# Patient Record
Sex: MALE | Race: WHITE | Employment: FULL TIME | URBAN - METROPOLITAN AREA
[De-identification: names, ages, dates, MRNs, and addresses within clinical notes are randomized per-mention and may not be internally consistent; named-entity substitution may affect disease eponyms.]

---

## 2017-06-16 ENCOUNTER — TELEPHONE (OUTPATIENT)
Dept: CARDIOLOGY CLINIC | Age: 62
End: 2017-06-16

## 2017-06-16 RX ORDER — CLOPIDOGREL BISULFATE 75 MG/1
75 TABLET ORAL DAILY
Qty: 90 TABLET | Refills: 1 | Status: SHIPPED | OUTPATIENT
Start: 2017-06-16 | End: 2017-12-11 | Stop reason: SDUPTHER

## 2017-06-16 RX ORDER — METOPROLOL SUCCINATE 25 MG/1
12.5 TABLET, EXTENDED RELEASE ORAL DAILY
Qty: 90 TABLET | Refills: 1 | Status: SHIPPED | OUTPATIENT
Start: 2017-06-16 | End: 2018-06-25 | Stop reason: SDUPTHER

## 2017-06-26 ENCOUNTER — OFFICE VISIT (OUTPATIENT)
Dept: CARDIOLOGY CLINIC | Age: 62
End: 2017-06-26

## 2017-06-26 VITALS
HEART RATE: 86 BPM | SYSTOLIC BLOOD PRESSURE: 120 MMHG | WEIGHT: 249.3 LBS | DIASTOLIC BLOOD PRESSURE: 84 MMHG | HEIGHT: 74 IN | BODY MASS INDEX: 31.99 KG/M2 | OXYGEN SATURATION: 97 %

## 2017-06-26 DIAGNOSIS — R07.9 CHEST PAIN, UNSPECIFIED TYPE: ICD-10-CM

## 2017-06-26 DIAGNOSIS — R06.02 SOB (SHORTNESS OF BREATH): ICD-10-CM

## 2017-06-26 DIAGNOSIS — E78.49 OTHER HYPERLIPIDEMIA: ICD-10-CM

## 2017-06-26 DIAGNOSIS — Z71.6 TOBACCO ABUSE COUNSELING: ICD-10-CM

## 2017-06-26 DIAGNOSIS — I25.10 CAD IN NATIVE ARTERY: Primary | ICD-10-CM

## 2017-06-26 PROCEDURE — 99214 OFFICE O/P EST MOD 30 MIN: CPT | Performed by: INTERNAL MEDICINE

## 2017-11-01 ENCOUNTER — HOSPITAL ENCOUNTER (OUTPATIENT)
Dept: OTHER | Age: 62
Discharge: HOME | End: 2017-11-30
Attending: INTERNAL MEDICINE | Admitting: INTERNAL MEDICINE

## 2017-12-01 ENCOUNTER — HOSPITAL ENCOUNTER (OUTPATIENT)
Dept: OTHER | Age: 62
Discharge: HOME | End: 2017-12-31
Attending: INTERNAL MEDICINE | Admitting: INTERNAL MEDICINE

## 2017-12-11 RX ORDER — CLOPIDOGREL BISULFATE 75 MG/1
TABLET ORAL
Qty: 30 TABLET | Refills: 6 | Status: SHIPPED | OUTPATIENT
Start: 2017-12-11 | End: 2018-09-04 | Stop reason: SDUPTHER

## 2018-01-01 ENCOUNTER — HOSPITAL ENCOUNTER (OUTPATIENT)
Dept: OTHER | Age: 63
Discharge: HOME | End: 2018-01-31
Attending: INTERNAL MEDICINE | Admitting: INTERNAL MEDICINE

## 2018-02-01 ENCOUNTER — HOSPITAL ENCOUNTER (OUTPATIENT)
Dept: OTHER | Age: 63
Discharge: HOME | End: 2018-02-28
Attending: INTERNAL MEDICINE | Admitting: INTERNAL MEDICINE

## 2018-03-01 ENCOUNTER — HOSPITAL ENCOUNTER (OUTPATIENT)
Dept: OTHER | Age: 63
Discharge: HOME | End: 2018-03-31
Attending: INTERNAL MEDICINE | Admitting: INTERNAL MEDICINE

## 2018-04-01 ENCOUNTER — HOSPITAL ENCOUNTER (OUTPATIENT)
Dept: OTHER | Age: 63
Discharge: HOME | End: 2018-04-30
Attending: INTERNAL MEDICINE | Admitting: INTERNAL MEDICINE

## 2018-05-01 ENCOUNTER — HOSPITAL ENCOUNTER (OUTPATIENT)
Dept: OTHER | Age: 63
Discharge: HOME | End: 2018-05-31
Attending: INTERNAL MEDICINE | Admitting: INTERNAL MEDICINE

## 2018-06-01 ENCOUNTER — HOSPITAL ENCOUNTER (OUTPATIENT)
Dept: OTHER | Age: 63
Discharge: HOME | End: 2018-06-30
Attending: INTERNAL MEDICINE | Admitting: INTERNAL MEDICINE

## 2018-06-25 RX ORDER — METOPROLOL SUCCINATE 25 MG/1
TABLET, EXTENDED RELEASE ORAL
Qty: 30 TABLET | Refills: 0 | Status: SHIPPED | OUTPATIENT
Start: 2018-06-25 | End: 2018-09-04 | Stop reason: SDUPTHER

## 2018-07-01 ENCOUNTER — HOSPITAL ENCOUNTER (OUTPATIENT)
Dept: OTHER | Age: 63
Discharge: HOME | End: 2018-07-01
Attending: INTERNAL MEDICINE | Admitting: INTERNAL MEDICINE

## 2018-07-27 RX ORDER — CLOPIDOGREL BISULFATE 75 MG/1
TABLET ORAL
Qty: 30 TABLET | Refills: 5 | OUTPATIENT
Start: 2018-07-27

## 2018-09-05 RX ORDER — METOPROLOL SUCCINATE 25 MG/1
TABLET, EXTENDED RELEASE ORAL
Qty: 45 TABLET | Refills: 0 | Status: SHIPPED | OUTPATIENT
Start: 2018-09-05 | End: 2018-12-06 | Stop reason: SDUPTHER

## 2018-09-05 RX ORDER — CLOPIDOGREL BISULFATE 75 MG/1
TABLET ORAL
Qty: 90 TABLET | Refills: 0 | Status: SHIPPED | OUTPATIENT
Start: 2018-09-05 | End: 2018-12-06 | Stop reason: SDUPTHER

## 2018-10-19 ENCOUNTER — HOSPITAL ENCOUNTER (OUTPATIENT)
Age: 63
Discharge: HOME | End: 2018-10-19

## 2018-10-19 PROCEDURE — 9900000038 HC CARDIAC REHAB PHASE 3 - MONTHLY

## 2018-11-20 ENCOUNTER — HOSPITAL ENCOUNTER (OUTPATIENT)
Age: 63
Discharge: HOME | End: 2018-11-20
Payer: COMMERCIAL

## 2018-11-20 PROCEDURE — 93798 PHYS/QHP OP CAR RHAB W/ECG: CPT

## 2018-11-20 PROCEDURE — 9900000038 HC CARDIAC REHAB PHASE 3 - MONTHLY

## 2018-12-06 ENCOUNTER — OFFICE VISIT (OUTPATIENT)
Dept: CARDIOLOGY CLINIC | Age: 63
End: 2018-12-06
Payer: COMMERCIAL

## 2018-12-06 VITALS
DIASTOLIC BLOOD PRESSURE: 80 MMHG | HEART RATE: 72 BPM | SYSTOLIC BLOOD PRESSURE: 138 MMHG | HEIGHT: 74 IN | BODY MASS INDEX: 31.95 KG/M2 | WEIGHT: 249 LBS

## 2018-12-06 DIAGNOSIS — I25.10 CAD IN NATIVE ARTERY: Primary | ICD-10-CM

## 2018-12-06 DIAGNOSIS — E78.49 OTHER HYPERLIPIDEMIA: ICD-10-CM

## 2018-12-06 DIAGNOSIS — Z71.6 TOBACCO ABUSE COUNSELING: ICD-10-CM

## 2018-12-06 DIAGNOSIS — I49.01 VENTRICULAR FIBRILLATION (HCC): ICD-10-CM

## 2018-12-06 PROCEDURE — 99214 OFFICE O/P EST MOD 30 MIN: CPT | Performed by: INTERNAL MEDICINE

## 2018-12-06 RX ORDER — METOPROLOL SUCCINATE 25 MG/1
12.5 TABLET, EXTENDED RELEASE ORAL DAILY
Qty: 45 TABLET | Refills: 3 | Status: SHIPPED | OUTPATIENT
Start: 2018-12-06 | End: 2018-12-13 | Stop reason: SDUPTHER

## 2018-12-06 RX ORDER — CLOPIDOGREL BISULFATE 75 MG/1
75 TABLET ORAL DAILY
Qty: 90 TABLET | Refills: 3 | Status: SHIPPED | OUTPATIENT
Start: 2018-12-06 | End: 2018-12-13 | Stop reason: SDUPTHER

## 2018-12-12 ENCOUNTER — HOSPITAL ENCOUNTER (OUTPATIENT)
Age: 63
Discharge: HOME | End: 2018-12-12

## 2018-12-12 PROCEDURE — 9900000038 HC CARDIAC REHAB PHASE 3 - MONTHLY

## 2018-12-13 RX ORDER — CLOPIDOGREL BISULFATE 75 MG/1
75 TABLET ORAL DAILY
Qty: 90 TABLET | Refills: 3 | Status: SHIPPED | OUTPATIENT
Start: 2018-12-13 | End: 2020-01-16 | Stop reason: SDUPTHER

## 2018-12-13 RX ORDER — METOPROLOL SUCCINATE 25 MG/1
12.5 TABLET, EXTENDED RELEASE ORAL DAILY
Qty: 45 TABLET | Refills: 3 | Status: SHIPPED | OUTPATIENT
Start: 2018-12-13 | End: 2019-08-23 | Stop reason: ALTCHOICE

## 2019-01-11 ENCOUNTER — HOSPITAL ENCOUNTER (OUTPATIENT)
Age: 64
Discharge: HOME | End: 2019-01-11

## 2019-01-11 PROCEDURE — 9900000038 HC CARDIAC REHAB PHASE 3 - MONTHLY

## 2019-02-20 ENCOUNTER — HOSPITAL ENCOUNTER (OUTPATIENT)
Age: 64
Discharge: HOME | End: 2019-02-20

## 2019-02-20 PROCEDURE — 9900000038 HC CARDIAC REHAB PHASE 3 - MONTHLY

## 2019-03-20 ENCOUNTER — HOSPITAL ENCOUNTER (OUTPATIENT)
Age: 64
Discharge: HOME | End: 2019-03-20

## 2019-03-20 PROCEDURE — 9900000038 HC CARDIAC REHAB PHASE 3 - MONTHLY

## 2019-04-17 ENCOUNTER — HOSPITAL ENCOUNTER (OUTPATIENT)
Age: 64
Discharge: HOME | End: 2019-04-17

## 2019-04-17 PROCEDURE — 9900000038 HC CARDIAC REHAB PHASE 3 - MONTHLY

## 2019-05-15 ENCOUNTER — HOSPITAL ENCOUNTER (OUTPATIENT)
Age: 64
Discharge: HOME | End: 2019-05-15

## 2019-05-15 PROCEDURE — 9900000038 HC CARDIAC REHAB PHASE 3 - MONTHLY

## 2019-06-19 ENCOUNTER — HOSPITAL ENCOUNTER (OUTPATIENT)
Age: 64
Discharge: HOME | End: 2019-06-19

## 2019-06-19 PROCEDURE — 9900000038 HC CARDIAC REHAB PHASE 3 - MONTHLY

## 2019-07-17 PROCEDURE — 9900000038 HC CARDIAC REHAB PHASE 3 - MONTHLY

## 2019-07-29 ENCOUNTER — HOSPITAL ENCOUNTER (OUTPATIENT)
Dept: CARDIAC REHAB | Age: 64
Discharge: HOME | End: 2019-07-29

## 2019-08-08 ENCOUNTER — HOSPITAL ENCOUNTER (OUTPATIENT)
Dept: NON INVASIVE DIAGNOSTICS | Age: 64
Discharge: HOME | End: 2019-08-08
Payer: MEDICARE

## 2019-08-08 DIAGNOSIS — I49.01 VENTRICULAR FIBRILLATION (HCC): ICD-10-CM

## 2019-08-08 DIAGNOSIS — I25.10 CAD IN NATIVE ARTERY: ICD-10-CM

## 2019-08-08 LAB
LV EF: 50 %
LVEF MODALITY: NORMAL

## 2019-08-08 PROCEDURE — 93351 STRESS TTE COMPLETE: CPT

## 2019-08-08 PROCEDURE — 93306 TTE W/DOPPLER COMPLETE: CPT

## 2019-08-08 PROCEDURE — 93320 DOPPLER ECHO COMPLETE: CPT

## 2019-08-15 NOTE — PROGRESS NOTES
[address]   Cardiac Follow Up    Chief Complaint   Patient presents with    Coronary Artery Disease    Hyperlipidemia     History of Present Illness:  Mr. Melanie Small is a 71 y.o. male. His history includes an admission to [facility] in February 2015 with complaints of chest pain and shortness of  breath for approximately 2 weeks. His EKG showed inferior ischemia, and patient subsequently had Vfib arrest. He was shocked twice. CPR was administered with restoration of normal sinus rhythm. He was transferred to [facility] and taken emergently to the cardiac catheterization lab which showed the left main to be normal, LAD 50%, proximal D1 30%, proximal circumflex normal, OM2 90% sequential lesions and less than 2 mm vessels, RCA 80% mid. LV gram initially showed an EF of 45% with anterior apical hypokinesis. After intervention, EF improved to 55% with resolution of anterior apical hypokinesis. He had a PCI of the RCA using a 3.5 x 23mm drug-eluting stent postdilated to 3.75, and IVUS to the RCA 80% mid without clot. Due to ventricular fibrillation in the presence of his MI,  LifeVest versus medical treatment with Holter monitoring and a plain stress later were discussed. In the week after discharge ACE inhibitor was held. LV function was normal. He decided not to pursue a LifeVest.  He wore a holter monitor later in February 2015 and it showed no lifethreatening arrhythmias. Today, [facility] states he feels well, no complaints. He denies exertional chest pain, WHITEHEAD/PND, palpitations, light-headedness, edema. He is busy with his job. He exercises regularly without problems. He reports symptoms of ED and is asking if this could be a side effect of any of his medications.      Past Medical History:  Past Medical History:   Diagnosis Date    Hyperlipidemia      Past Surgical History:  Past Surgical History:   Procedure Laterality Date    APPENDECTOMY      CORONARY ANGIOPLASTY WITH STENT reflects all work, treatment, procedures, and medical decision making performed by me.

## 2019-08-21 PROCEDURE — 9900000038 HC CARDIAC REHAB PHASE 3 - MONTHLY

## 2019-08-23 ENCOUNTER — OFFICE VISIT (OUTPATIENT)
Dept: CARDIOLOGY CLINIC | Age: 64
End: 2019-08-23
Payer: MEDICARE

## 2019-08-23 VITALS
OXYGEN SATURATION: 96 % | WEIGHT: 241 LBS | SYSTOLIC BLOOD PRESSURE: 134 MMHG | HEART RATE: 64 BPM | BODY MASS INDEX: 30.93 KG/M2 | HEIGHT: 74 IN | DIASTOLIC BLOOD PRESSURE: 72 MMHG

## 2019-08-23 DIAGNOSIS — I25.10 CAD IN NATIVE ARTERY: Primary | ICD-10-CM

## 2019-08-23 DIAGNOSIS — Z71.6 TOBACCO ABUSE COUNSELING: ICD-10-CM

## 2019-08-23 DIAGNOSIS — E78.49 OTHER HYPERLIPIDEMIA: ICD-10-CM

## 2019-08-23 PROCEDURE — 4040F PNEUMOC VAC/ADMIN/RCVD: CPT | Performed by: INTERNAL MEDICINE

## 2019-08-23 PROCEDURE — G8427 DOCREV CUR MEDS BY ELIG CLIN: HCPCS | Performed by: INTERNAL MEDICINE

## 2019-08-23 PROCEDURE — G8598 ASA/ANTIPLAT THER USED: HCPCS | Performed by: INTERNAL MEDICINE

## 2019-08-23 PROCEDURE — 1123F ACP DISCUSS/DSCN MKR DOCD: CPT | Performed by: INTERNAL MEDICINE

## 2019-08-23 PROCEDURE — G8417 CALC BMI ABV UP PARAM F/U: HCPCS | Performed by: INTERNAL MEDICINE

## 2019-08-23 PROCEDURE — 99214 OFFICE O/P EST MOD 30 MIN: CPT | Performed by: INTERNAL MEDICINE

## 2019-08-23 PROCEDURE — 3017F COLORECTAL CA SCREEN DOC REV: CPT | Performed by: INTERNAL MEDICINE

## 2019-08-23 PROCEDURE — 4004F PT TOBACCO SCREEN RCVD TLK: CPT | Performed by: INTERNAL MEDICINE

## 2019-08-26 ENCOUNTER — HOSPITAL ENCOUNTER (OUTPATIENT)
Dept: CARDIAC REHAB | Age: 64
Discharge: HOME | End: 2019-08-26

## 2019-09-18 PROCEDURE — 9900000038 HC CARDIAC REHAB PHASE 3 - MONTHLY

## 2019-09-30 ENCOUNTER — HOSPITAL ENCOUNTER (OUTPATIENT)
Dept: CARDIAC REHAB | Age: 64
Discharge: HOME | End: 2019-09-30

## 2019-10-23 PROCEDURE — 9900000038 HC CARDIAC REHAB PHASE 3 - MONTHLY

## 2019-10-28 ENCOUNTER — HOSPITAL ENCOUNTER (OUTPATIENT)
Dept: CARDIAC REHAB | Age: 64
Discharge: HOME | End: 2019-10-28

## 2019-11-20 PROCEDURE — 9900000038 HC CARDIAC REHAB PHASE 3 - MONTHLY

## 2019-11-25 ENCOUNTER — HOSPITAL ENCOUNTER (OUTPATIENT)
Dept: CARDIAC REHAB | Age: 64
Discharge: HOME | End: 2019-11-25

## 2019-12-18 PROCEDURE — 9900000038 HC CARDIAC REHAB PHASE 3 - MONTHLY

## 2019-12-30 ENCOUNTER — HOSPITAL ENCOUNTER (OUTPATIENT)
Dept: CARDIAC REHAB | Age: 64
Discharge: HOME | End: 2019-12-30

## 2020-01-16 ENCOUNTER — TELEPHONE (OUTPATIENT)
Dept: CARDIOLOGY CLINIC | Age: 65
End: 2020-01-16

## 2020-01-16 RX ORDER — CLOPIDOGREL BISULFATE 75 MG/1
75 TABLET ORAL DAILY
Qty: 90 TABLET | Refills: 0 | Status: CANCELLED | OUTPATIENT
Start: 2020-01-16

## 2020-01-16 RX ORDER — CLOPIDOGREL BISULFATE 75 MG/1
75 TABLET ORAL DAILY
Qty: 90 TABLET | Refills: 3 | Status: SHIPPED | OUTPATIENT
Start: 2020-01-16 | End: 2020-12-08 | Stop reason: SDUPTHER

## 2020-01-16 NOTE — TELEPHONE ENCOUNTER
RX APPROVAL:      Refill:   Requested Prescriptions      No prescriptions requested or ordered in this encounter      Last OV: next ov 3/5/20  Last EKG:   Last Labs:   Lab Results   Component Value Date    GLUCOSE 102 03/02/2017    BUN 19 03/02/2017    CREATININE 1.05 03/02/2017    LABGLOM >60 03/02/2017     03/02/2017    K 4.5 03/02/2017     03/02/2017    CO2 26 03/02/2017    CALCIUM 9.7 03/02/2017     Lab Results   Component Value Date     03/02/2017     03/02/2017    CO2 26 03/02/2017    ANIONGAP 14 06/03/2015    GLUCOSE 102 03/02/2017    BUN 19 03/02/2017    CREATININE 1.05 03/02/2017    LABGLOM >60 03/02/2017    GFRAA >60 03/02/2017    CALCIUM 9.7 03/02/2017    PROT 7.3 03/02/2017    LABALBU 4.2 03/02/2017    BILITOT 0.6 03/02/2017    ALKPHOS 77 03/02/2017    AST 19 03/02/2017    ALT 23 03/02/2017     Lab Results   Component Value Date    ALT 23 03/02/2017    AST 19 03/02/2017     Lab Results   Component Value Date    K 4.5 03/02/2017       Plan and labs reviewed

## 2020-01-22 PROCEDURE — 9900000038 HC CARDIAC REHAB PHASE 3 - MONTHLY

## 2020-01-27 ENCOUNTER — HOSPITAL ENCOUNTER (OUTPATIENT)
Dept: CARDIAC REHAB | Age: 65
Discharge: HOME | End: 2020-01-27

## 2020-02-19 PROCEDURE — 9900000038 HC CARDIAC REHAB PHASE 3 - MONTHLY

## 2020-02-24 ENCOUNTER — HOSPITAL ENCOUNTER (OUTPATIENT)
Dept: CARDIAC REHAB | Age: 65
Discharge: HOME | End: 2020-02-24

## 2020-03-05 ENCOUNTER — OFFICE VISIT (OUTPATIENT)
Dept: CARDIOLOGY CLINIC | Age: 65
End: 2020-03-05
Payer: MEDICARE

## 2020-03-05 VITALS
BODY MASS INDEX: 33.8 KG/M2 | SYSTOLIC BLOOD PRESSURE: 135 MMHG | RESPIRATION RATE: 18 BRPM | HEIGHT: 73 IN | HEART RATE: 66 BPM | WEIGHT: 255 LBS | DIASTOLIC BLOOD PRESSURE: 77 MMHG

## 2020-03-05 PROBLEM — Z78.9 PLAVIX RESISTANCE: Status: ACTIVE | Noted: 2020-03-05

## 2020-03-05 PROCEDURE — 3017F COLORECTAL CA SCREEN DOC REV: CPT | Performed by: INTERNAL MEDICINE

## 2020-03-05 PROCEDURE — 4004F PT TOBACCO SCREEN RCVD TLK: CPT | Performed by: INTERNAL MEDICINE

## 2020-03-05 PROCEDURE — 99214 OFFICE O/P EST MOD 30 MIN: CPT | Performed by: INTERNAL MEDICINE

## 2020-03-05 PROCEDURE — G8417 CALC BMI ABV UP PARAM F/U: HCPCS | Performed by: INTERNAL MEDICINE

## 2020-03-05 PROCEDURE — 1123F ACP DISCUSS/DSCN MKR DOCD: CPT | Performed by: INTERNAL MEDICINE

## 2020-03-05 PROCEDURE — G8484 FLU IMMUNIZE NO ADMIN: HCPCS | Performed by: INTERNAL MEDICINE

## 2020-03-05 PROCEDURE — 4040F PNEUMOC VAC/ADMIN/RCVD: CPT | Performed by: INTERNAL MEDICINE

## 2020-03-05 PROCEDURE — G8427 DOCREV CUR MEDS BY ELIG CLIN: HCPCS | Performed by: INTERNAL MEDICINE

## 2020-03-05 RX ORDER — COLCHICINE 0.6 MG/1
TABLET ORAL
COMMUNITY
Start: 2020-02-05

## 2020-08-13 NOTE — PROGRESS NOTES
SURGERY       Social History:    Social History     Tobacco Use   Smoking Status Current Some Day Smoker    Packs/day: 1.00    Years: 45.00    Pack years: 45.00    Types: Cigars, Cigarettes   Smokeless Tobacco Never Used   Tobacco Comment    1 pack per week      Current Medications:  Current Outpatient Medications   Medication Sig Dispense Refill    colchicine (COLCRYS) 0.6 MG tablet       clopidogrel (PLAVIX) 75 MG tablet Take 1 tablet by mouth daily 90 tablet 3    nitroGLYCERIN (NITROSTAT) 0.4 MG SL tablet Place 1 tablet under the tongue every 5 minutes as needed for Chest pain 25 tablet 1    vitamin B-12 (CYANOCOBALAMIN) 500 MCG tablet Take 500 mcg by mouth daily      rosuvastatin (CRESTOR) 20 MG tablet Take 20 mg by mouth daily      indomethacin (INDOCIN SR) 75 MG SR capsule Take 75 mg by mouth as needed      aspirin 81 MG chewable tablet Take 1 tablet by mouth daily. 30 tablet 3     No current facility-administered medications for this visit. REVIEW OF SYSTEMS:   CONSTITUTIONAL: No major weight gain or loss, fatigue, weakness, night sweats or fever. There's been no change in energy level, sleep pattern, or activity level. HEENT: No new vision difficulties or ringing in the ears. RESPIRATORY: No new SOB, PND, orthopnea or cough. CARDIOVASCULAR: See HPI  GI: No nausea, vomiting, diarrhea, constipation, abdominal pain or changes in bowel habits. : No urinary frequency, urgency, incontinence hematuria or dysuria. SKIN: No cyanosis or skin lesions. MUSCULOSKELETAL: No new muscle or joint pain. NEUROLOGICAL: No syncope or TIA-like symptoms.   PSYCHIATRIC: No anxiety, pain, insomnia or depression    PHYSICAL EXAM:        VITALS:    /76 (Site: Right Upper Arm, Position: Sitting, Cuff Size: Large Adult)   Pulse 70   Temp 97.3 °F (36.3 °C)   Resp 20   Ht 6' 2\" (1.88 m)   Wt 246 lb (111.6 kg)   SpO2 99%   BMI 31.58 kg/m²     CONSTITUTIONAL: Cooperative, no apparent distress, and appears well nourished / developed  NEUROLOGIC:  Awake and orientated to person, place and time. PSYCH: Calm affect. SKIN: Warm and dry. HEENT: Sclera non-icteric, normocephalic, neck supple, no elevation of JVP, normal carotid pulses with no bruits and thyroid normal size. LUNGS:  No increased work of breathing and clear to auscultation, no crackles or wheezing. CARDIOVASCULAR:  Regular rate and rhythm with no murmurs, gallops, rubs, or abnormal heart sounds, normal PMI. The apical impulses not displaced. Heart tones are crisp and normal.  Cervical veins are not engorged. JVP less than 8 cm H2O. The carotid upstroke is normal in amplitude and contour without delay or bruit   ABDOMEN:  Normal bowel sounds, non-distended and non-tender to palpation  EXT: No edema, no calf tenderness. Pulses are present bilaterally. DATA:    Radiology Review:  Pertinent images / reports were reviewed as a part of this visit and reveals the following:    Echo: 2/7/15  Normal left ventricle size, wall thickness and systolic function with an estimated ejection fraction of 55%. Mild mitral regurgitation is present. There is mild tricuspid regurgitation with RVSP estimated at 28 mmHg. Stress Test: 2/13/15  Nondiagnostic GXT due to resting ST segment abnormalities. Fair exercise tolerance. No exercise induced chest pain.      Angiogram: 2/6/15  [facility] SUMMARY  ~LM normal  ~LAD 50% proximal, D1 30% proximal  ~Cx Normal, OM2 90% sequential lesions in <2mm vessel  ~RCA 80% mid(IVUS)  ~LVG Initial 45% with anteroapical hypokinesis  After intervention 55% with resolution of anteroapical hypokinesis    Impression  ~Angiography w/ obstructive RCA disease  ~LVEDP   ~LVG with     Intervention  ~PCI of RCA with [id number] postdilated to 3.75  ~IVUS of RCA - 80% mid without clot  ~IVUS LAD - 50% proximal, no evidence clot  ~IVUS of D1 - 30% proximal, no clear evidence clot    GXT juve 12/8/16:  There is normal isotope uptake at stress and rest. There is no evidence of    myocardial ischemia or scar.    Normal LV function.    Overall findings represent a low risk scan. Assessment:  1. CAD: Stable, no anginal symptoms. Needs stress test periodically. Plan for stress testing every other year/next summer. 2/2015 cath> PCI of RCA with [id number], RCA 80% but without clot, LAD 50%, D1 30%  Nuclear stress test 12/8/16> Normal  Stress Echo 8/8/19>  Non-diagnostic due to failure to reach target heart rate.   No ischemic changes seen to 79% heart rate.   Good exercise tolerance exercising 9 minutes on [place] protocol. 2. Cardiac arrest: Ventricular fibrillation at time of MI. Lifevest was recommended, but post revascularistion, EF returned to normal.    3. Hyperlipidemia: Stable on Crestor 20 mg.  7/29/20> , TG 72, HDL 36, LDL 63.   4. Tobacco abuse counseling:  Smoking cessation discussed. Plan:  Plan for stress testing every other year/next summer. Vikki Reynoso has a stable cardiac status. Cardiac test and lab results personally reviewed by me during this office visit and discussed. No med changes. He is safe to use Viagra or Cialis if needed for ED. Continue risk factor modifications. Call for any change in symptoms. Return for regular follow up in 6 months. Thank you for allowing to us to participate in the care of Vikki Reynoso. Yari Tenorio MD, [facility]      Patient's problem list, medications, allergies, past medical, surgical, social and family histories were reviewed and updated as appropriate. Scribe's attestation: This note was scribed in the presence of Dr. David Tamayo MD, by Michelle Grove RN. The scribe's documentation has been prepared under my direction and personally reviewed by me in its entirety. I confirm that the note above accurately reflects all work, treatment, procedures, and medical decision making performed by me.

## 2020-08-14 ENCOUNTER — OFFICE VISIT (OUTPATIENT)
Dept: CARDIOLOGY CLINIC | Age: 65
End: 2020-08-14
Payer: MEDICARE

## 2020-08-14 VITALS
TEMPERATURE: 97.3 F | HEART RATE: 70 BPM | HEIGHT: 74 IN | BODY MASS INDEX: 31.57 KG/M2 | SYSTOLIC BLOOD PRESSURE: 134 MMHG | RESPIRATION RATE: 20 BRPM | DIASTOLIC BLOOD PRESSURE: 76 MMHG | WEIGHT: 246 LBS | OXYGEN SATURATION: 99 %

## 2020-08-14 PROCEDURE — G8417 CALC BMI ABV UP PARAM F/U: HCPCS | Performed by: INTERNAL MEDICINE

## 2020-08-14 PROCEDURE — 4004F PT TOBACCO SCREEN RCVD TLK: CPT | Performed by: INTERNAL MEDICINE

## 2020-08-14 PROCEDURE — 3017F COLORECTAL CA SCREEN DOC REV: CPT | Performed by: INTERNAL MEDICINE

## 2020-08-14 PROCEDURE — G8427 DOCREV CUR MEDS BY ELIG CLIN: HCPCS | Performed by: INTERNAL MEDICINE

## 2020-08-14 PROCEDURE — 4040F PNEUMOC VAC/ADMIN/RCVD: CPT | Performed by: INTERNAL MEDICINE

## 2020-08-14 PROCEDURE — 1123F ACP DISCUSS/DSCN MKR DOCD: CPT | Performed by: INTERNAL MEDICINE

## 2020-08-14 PROCEDURE — 99214 OFFICE O/P EST MOD 30 MIN: CPT | Performed by: INTERNAL MEDICINE

## 2020-12-08 RX ORDER — CLOPIDOGREL BISULFATE 75 MG/1
75 TABLET ORAL DAILY
Qty: 90 TABLET | Refills: 3 | Status: SHIPPED | OUTPATIENT
Start: 2020-12-08 | End: 2021-12-06

## 2020-12-08 NOTE — TELEPHONE ENCOUNTER
RX APPROVAL:      Refill:   Requested Prescriptions      No prescriptions requested or ordered in this encounter      Last OV: 8/14/20  Last EKG:   Last Labs:   Lab Results   Component Value Date    GLUCOSE 102 03/02/2017    BUN 19 03/02/2017    CREATININE 1.05 03/02/2017    LABGLOM >60 03/02/2017     03/02/2017    K 4.5 03/02/2017     03/02/2017    CO2 26 03/02/2017    CALCIUM 9.7 03/02/2017     Lab Results   Component Value Date     03/02/2017     03/02/2017    CO2 26 03/02/2017    ANIONGAP 14 06/03/2015    GLUCOSE 102 03/02/2017    BUN 19 03/02/2017    CREATININE 1.05 03/02/2017    LABGLOM >60 03/02/2017    GFRAA >60 03/02/2017    CALCIUM 9.7 03/02/2017    PROT 7.3 03/02/2017    LABALBU 4.2 03/02/2017    BILITOT 0.6 03/02/2017    ALKPHOS 77 03/02/2017    AST 19 03/02/2017    ALT 23 03/02/2017     Lab Results   Component Value Date    ALT 23 03/02/2017    AST 19 03/02/2017     Lab Results   Component Value Date    K 4.5 03/02/2017       Plan and labs reviewed

## 2020-12-08 NOTE — TELEPHONE ENCOUNTER
Medication Refill    Medication needing refilled: clopidogrel (PLAVIX) 75 MG tablet    Dosage of the medication:    How are you taking this medication (QD, BID, TID, QID, PRN): Take 1 tablet by mouth daily    30 or 90 day supply called in:  80     Which Pharm[facility] are we sending the medication to?:   [facility] [address] [phone] - F [phone]

## 2021-03-09 NOTE — PROGRESS NOTES
[facility], [place], operated by [facility]   Cardiac Follow Up    Chief Complaint   Patient presents with    6 Month Follow-Up    Coronary Artery Disease    Hyperlipidemia     History of Present Illness:  Mr. Antonina Lombard is a 76 y.o. male. His history includes an admission to [facility] in February 2015 with complaints of chest pain and shortness of  breath for approximately 2 weeks. His EKG showed inferior ischemia, and patient subsequently had Vfib arrest. He was shocked twice. CPR was administered with restoration of normal sinus rhythm. He was transferred to [facility] and taken emergently to the cardiac catheterization lab which showed the left main to be normal, LAD 50%, proximal D1 30%, proximal circumflex normal, OM2 90% sequential lesions and less than 2 mm vessels, RCA 80% mid. LV gram initially showed an EF of 45% with anterior apical hypokinesis. After intervention, EF improved to 55% with resolution of anterior apical hypokinesis. He had a PCI of the RCA using a 3.5 x 23mm drug-eluting stent postdilated to 3.75, and IVUS to the RCA 80% mid without clot. Due to ventricular fibrillation in the presence of his MI,  LifeVest versus medical treatment with Holter monitoring and a plain stress later were discussed. In the week after discharge ACE inhibitor was held. LV function was normal. He decided not to pursue a LifeVest.  He wore a holter monitor later in February 2015 and it showed no lifethreatening arrhythmias. Today Nathan Cowan returns for follow up. He is getting the second Covid vaccine this week. He is interested in Cardiac Rehab again. He is feeling good. He continues to smoke and also tries to cut back. He state she is fairly active but not much cardio. He is active outside working on ponds. Denies chest pain and palpitations, SOB and syncope.           Past Medical History:  Past Medical History:   Diagnosis Date    Hyperlipidemia      Past Surgical History:  Past Surgical History: Procedure Laterality Date    APPENDECTOMY      CATARACT REMOVAL WITH IMPLANT Bilateral 10/2020    CORONARY ANGIOPLASTY WITH STENT PLACEMENT  2/2015    HEMORRHOID SURGERY       Social History:    Social History     Tobacco Use   Smoking Status Current Some Day Smoker    Packs/day: 1.00    Years: 45.00    Pack years: 45.00    Types: Cigars, Cigarettes   Smokeless Tobacco Never Used   Tobacco Comment    1 pack per week      Current Medications:  Current Outpatient Medications   Medication Sig Dispense Refill    clopidogrel (PLAVIX) 75 MG tablet Take 1 tablet by mouth daily 90 tablet 3    colchicine (COLCRYS) 0.6 MG tablet       nitroGLYCERIN (NITROSTAT) 0.4 MG SL tablet Place 1 tablet under the tongue every 5 minutes as needed for Chest pain 25 tablet 1    vitamin B-12 (CYANOCOBALAMIN) 500 MCG tablet Take 500 mcg by mouth daily      rosuvastatin (CRESTOR) 20 MG tablet Take 20 mg by mouth daily      indomethacin (INDOCIN SR) 75 MG SR capsule Take 75 mg by mouth as needed      aspirin 81 MG chewable tablet Take 1 tablet by mouth daily. 30 tablet 3     No current facility-administered medications for this visit. REVIEW OF SYSTEMS:   CONSTITUTIONAL: No major weight gain or loss, fatigue, weakness, night sweats or fever. There's been no change in energy level, sleep pattern, or activity level. HEENT: No new vision difficulties or ringing in the ears. RESPIRATORY: No new SOB, PND, orthopnea or cough. CARDIOVASCULAR: See HPI  GI: No nausea, vomiting, diarrhea, constipation, abdominal pain or changes in bowel habits. : No urinary frequency, urgency, incontinence hematuria or dysuria. SKIN: No cyanosis or skin lesions. MUSCULOSKELETAL: No new muscle or joint pain. NEUROLOGICAL: No syncope or TIA-like symptoms.   PSYCHIATRIC: No anxiety, pain, insomnia or depression    PHYSICAL EXAM:        VITALS:    /76 (Site: Left Upper Arm, Position: Sitting, Cuff Size: Large Adult)   Pulse 72   Resp 20   Ht 6' 1\" (1.854 m)   Wt 249 lb (112.9 kg)   SpO2 98%   BMI 32.85 kg/m²     CONSTITUTIONAL: Cooperative, no apparent distress, and appears well nourished / developed  NEUROLOGIC:  Awake and orientated to person, place and time. PSYCH: Calm affect. SKIN: Warm and dry. HEENT: Sclera non-icteric, normocephalic, neck supple, no elevation of JVP, normal carotid pulses with no bruits and thyroid normal size. LUNGS:  No increased work of breathing and clear to auscultation, no crackles or wheezing. CARDIOVASCULAR:  Regular rate and rhythm with no murmurs, gallops, rubs, or abnormal heart sounds, normal PMI. The apical impulses not displaced. Heart tones are crisp and normal.  Cervical veins are not engorged. JVP less than 8 cm H2O. The carotid upstroke is normal in amplitude and contour without delay or bruit   ABDOMEN:  Normal bowel sounds, non-distended and non-tender to palpation  EXT: No edema, no calf tenderness. Pulses are present bilaterally. DATA:    8-8-19  Stress Echo  Non-diagnostic due to failure to reach target heart rate. No ischemic changes seen to 79% heart rate. Good exercise tolerance exercising 9 minutes on [place] protocol. Radiology Review:  Pertinent images / reports were reviewed as a part of this visit and reveals the following:    Echo: 2/7/15  Normal left ventricle size, wall thickness and systolic function with an estimated ejection fraction of 55%. Mild mitral regurgitation is present. There is mild tricuspid regurgitation with RVSP estimated at 28 mmHg. Stress Test: 2/13/15  Nondiagnostic GXT due to resting ST segment abnormalities. Fair exercise tolerance. No exercise induced chest pain.      Angiogram: 2/6/15  [facility] SUMMARY  ~LM normal  ~LAD 50% proximal, D1 30% proximal  ~Cx Normal, OM2 90% sequential lesions in <2mm vessel  ~RCA 80% mid(IVUS)  ~LVG Initial 45% with anteroapical hypokinesis  After intervention 55% with resolution of anteroapical hypokinesis    Impression  ~Angiography w/ obstructive RCA disease  ~LVEDP   ~LVG with     Intervention  ~PCI of RCA with [id number] postdilated to 3.75  ~IVUS of RCA - 80% mid without clot  ~IVUS LAD - 50% proximal, no evidence clot  ~IVUS of D1 - 30% proximal, no clear evidence clot    GXT juve 12/8/16:  There is normal isotope uptake at stress and rest. There is no evidence of    myocardial ischemia or scar.    Normal LV function.    Overall findings represent a low risk scan. Assessment:  1. Coronary artery disease involving native coronary artery of native heart without angina pectoris    2. Cardiac arrest ([facility] [address]    3. Other hyperlipidemia    4. Tobacco abuse counseling        1. CAD: Stable, no anginal symptoms. Needs stress test periodically. Plan for stress testing every other year/next summer. 2/2015 cath> PCI of RCA with 3.2Q21GLB, RCA 80% but without clot, LAD 50%, D1 30%  Nuclear stress test 12/8/16> Normal  Stress Echo 8/8/19>  Non-diagnostic due to failure to reach target heart rate.   No ischemic changes seen to 79% heart rate.   Good exercise tolerance exercising 9 minutes on [place] protocol.  ~ Denies chest pain. 2. Cardiac arrest: Ventricular fibrillation at time of MI. Veryl Tonny was recommended, but post revascularistion, EF returned to normal.    3. Hyperlipidemia: Stable on Crestor 20 mg.  7/29/20> , TG 72, HDL 36, LDL 63.   4. Tobacco abuse counseling:  Smoking cessation discussed again       Plan:  Plan for stress testing every other year/next summer. Cardiac test and lab results personally reviewed by me during this office visit and discussed with patient for Da Stark    No medication changes at this time. Continue risk factor modifications with heathy eating and moderate walking as exercise. Call for any change in symptoms such as chest pain or shortness of breath. Try to stop smoking. Fasting labs in 6 months.  Lipid, CBC, CMP, BNP  Schedule Stress Echo at the . To be done at this office. Thank you for allowing to us to participate in the care of Samantha Goodrich. Carmenza Moralez MD, [facility]    Patient's problem list, medications, allergies, past medical, surgical, social and family histories were reviewed and updated as appropriate. Scribe's attestation: This note was scribed in the presence of Dr. Dwayne Logno MD, by Kelsey Hernandez RN     The scribe's documentation has been prepared under my direction and personally reviewed by me in its entirety. I confirm that the note above accurately reflects all work, treatment, procedures, and medical decision making performed by me.

## 2021-03-12 ENCOUNTER — OFFICE VISIT (OUTPATIENT)
Dept: CARDIOLOGY CLINIC | Age: 66
End: 2021-03-12
Payer: MEDICARE

## 2021-03-12 VITALS
BODY MASS INDEX: 33 KG/M2 | SYSTOLIC BLOOD PRESSURE: 134 MMHG | OXYGEN SATURATION: 98 % | HEART RATE: 72 BPM | RESPIRATION RATE: 20 BRPM | DIASTOLIC BLOOD PRESSURE: 76 MMHG | HEIGHT: 73 IN | WEIGHT: 249 LBS

## 2021-03-12 DIAGNOSIS — E78.49 OTHER HYPERLIPIDEMIA: ICD-10-CM

## 2021-03-12 DIAGNOSIS — I46.9 CARDIAC ARREST (HCC): ICD-10-CM

## 2021-03-12 DIAGNOSIS — Z71.6 TOBACCO ABUSE COUNSELING: ICD-10-CM

## 2021-03-12 DIAGNOSIS — I25.10 CORONARY ARTERY DISEASE INVOLVING NATIVE CORONARY ARTERY OF NATIVE HEART WITHOUT ANGINA PECTORIS: Primary | ICD-10-CM

## 2021-03-12 DIAGNOSIS — R06.02 SOB (SHORTNESS OF BREATH): ICD-10-CM

## 2021-03-12 PROCEDURE — G8484 FLU IMMUNIZE NO ADMIN: HCPCS | Performed by: INTERNAL MEDICINE

## 2021-03-12 PROCEDURE — 1123F ACP DISCUSS/DSCN MKR DOCD: CPT | Performed by: INTERNAL MEDICINE

## 2021-03-12 PROCEDURE — G8417 CALC BMI ABV UP PARAM F/U: HCPCS | Performed by: INTERNAL MEDICINE

## 2021-03-12 PROCEDURE — G8427 DOCREV CUR MEDS BY ELIG CLIN: HCPCS | Performed by: INTERNAL MEDICINE

## 2021-03-12 PROCEDURE — 4040F PNEUMOC VAC/ADMIN/RCVD: CPT | Performed by: INTERNAL MEDICINE

## 2021-03-12 PROCEDURE — 4004F PT TOBACCO SCREEN RCVD TLK: CPT | Performed by: INTERNAL MEDICINE

## 2021-03-12 PROCEDURE — 3017F COLORECTAL CA SCREEN DOC REV: CPT | Performed by: INTERNAL MEDICINE

## 2021-03-12 PROCEDURE — 99214 OFFICE O/P EST MOD 30 MIN: CPT | Performed by: INTERNAL MEDICINE

## 2021-03-12 RX ORDER — NITROGLYCERIN 0.4 MG/1
0.4 TABLET SUBLINGUAL EVERY 5 MIN PRN
Qty: 25 TABLET | Refills: 1 | Status: SHIPPED | OUTPATIENT
Start: 2021-03-12

## 2021-03-12 RX ORDER — ROSUVASTATIN CALCIUM 20 MG/1
20 TABLET, COATED ORAL DAILY
Qty: 90 TABLET | Refills: 3 | Status: SHIPPED | OUTPATIENT
Start: 2021-03-12 | End: 2022-03-25 | Stop reason: SDUPTHER

## 2021-03-12 NOTE — PATIENT INSTRUCTIONS
No medication changes at this time. Continue risk factor modifications with heathy eating and moderate walking as exercise. Call for any change in symptoms such as chest pain or shortness of breath. Try to stop smoking. Fasting labs in 6 months. Lipid, CBC, CMP, BNP  Schedule Stress Echo at the . To be done at this office.

## 2021-09-08 NOTE — PROGRESS NOTES
[address]   Cardiac Follow Up    Chief Complaint   Patient presents with    6 Month Follow-Up    Coronary Artery Disease    Hyperlipidemia     History of Present Illness:  Mr. Maria Luisa Vigil is a 78 y.o. male. His history includes an admission to [facility] in February 2015 with complaints of chest pain and shortness of  breath for approximately 2 weeks. His EKG showed inferior ischemia, and patient subsequently had Vfib arrest. He was shocked twice. CPR was administered with restoration of normal sinus rhythm. He was transferred to [facility] and taken emergently to the cardiac catheterization lab which showed the left main to be normal, LAD 50%, proximal D1 30%, proximal circumflex normal, OM2 90% sequential lesions and less than 2 mm vessels, RCA 80% mid. LV gram initially showed an EF of 45% with anterior apical hypokinesis. After intervention, EF improved to 55% with resolution of anterior apical hypokinesis. He had a PCI of the RCA using a 3.5 x 23mm drug-eluting stent postdilated to 3.75, and IVUS to the RCA 80% mid without clot. Due to ventricular fibrillation in the presence of his MI,  LifeVest versus medical treatment with Holter monitoring and a plain stress later were discussed. In the week after discharge ACE inhibitor was held. LV function was normal. He decided not to pursue a LifeVest.  He wore a holter monitor later in February 2015 and it showed no lifethreatening arrhythmias. Today Portia Castanon returns for follow up. He is feeling good. He continues to smoke and also tries to cut back. He states he is active, has over 100 acres of property he maintains. He is active outside. Denies chest pain and palpitations, SOB and syncope. He would like to get the COVID vaccine booster when available.        Past Medical History:  Past Medical History:   Diagnosis Date    Hyperlipidemia      Past Surgical History:  Past Surgical History:   Procedure Laterality Date    APPENDECTOMY      CATARACT REMOVAL WITH IMPLANT Bilateral 10/2020    CORONARY ANGIOPLASTY WITH STENT PLACEMENT  2/2015    HEMORRHOID SURGERY       Social History:    Social History     Tobacco Use   Smoking Status Current Some Day Smoker    Packs/day: 1.00    Years: 45.00    Pack years: 45.00    Types: Cigars, Cigarettes   Smokeless Tobacco Never Used   Tobacco Comment    1 pack per week      Current Medications:  Current Outpatient Medications   Medication Sig Dispense Refill    nitroGLYCERIN (NITROSTAT) 0.4 MG SL tablet Place 1 tablet under the tongue every 5 minutes as needed for Chest pain 25 tablet 1    rosuvastatin (CRESTOR) 20 MG tablet Take 1 tablet by mouth daily 90 tablet 3    clopidogrel (PLAVIX) 75 MG tablet Take 1 tablet by mouth daily 90 tablet 3    colchicine (COLCRYS) 0.6 MG tablet       vitamin B-12 (CYANOCOBALAMIN) 500 MCG tablet Take 500 mcg by mouth daily      indomethacin (INDOCIN SR) 75 MG SR capsule Take 75 mg by mouth as needed      aspirin 81 MG chewable tablet Take 1 tablet by mouth daily. 30 tablet 3     No current facility-administered medications for this visit. REVIEW OF SYSTEMS:   CONSTITUTIONAL: No major weight gain or loss, fatigue, weakness, night sweats or fever. There's been no change in energy level, sleep pattern, or activity level. HEENT: No new vision difficulties or ringing in the ears. RESPIRATORY: No new SOB, PND, orthopnea or cough. CARDIOVASCULAR: See HPI  GI: No nausea, vomiting, diarrhea, constipation, abdominal pain or changes in bowel habits. : No urinary frequency, urgency, incontinence hematuria or dysuria. SKIN: No cyanosis or skin lesions. MUSCULOSKELETAL: No new muscle or joint pain. NEUROLOGICAL: No syncope or TIA-like symptoms.   PSYCHIATRIC: No anxiety, pain, insomnia or depression    PHYSICAL EXAM:        VITALS:    /84 (Site: Left Upper Arm, Position: Sitting, Cuff Size: Large Adult)   Pulse 70   Ht 6' 2\" (1.88 m)   Wt 250 lb (113.4 kg)   SpO2 98%   BMI 32.10 kg/m²     CONSTITUTIONAL: Cooperative, no apparent distress, and appears well nourished / developed  NEUROLOGIC:  Awake and orientated to person, place and time. PSYCH: Calm affect. SKIN: Warm and dry. HEENT: Sclera non-icteric, normocephalic, neck supple, no elevation of JVP, normal carotid pulses with no bruits and thyroid normal size. LUNGS:  No increased work of breathing and clear to auscultation, no crackles or wheezing. CARDIOVASCULAR:  Regular rate and rhythm with no murmurs, gallops, rubs, or abnormal heart sounds, normal PMI. The apical impulses not displaced. Heart tones are crisp and normal.  Cervical veins are not engorged. JVP less than 8 cm H2O. The carotid upstroke is normal in amplitude and contour without delay or bruit   ABDOMEN:  Normal bowel sounds, non-distended and non-tender to palpation  EXT: No edema, no calf tenderness. Pulses are present bilaterally. DATA:    8-8-19  Stress Echo  Non-diagnostic due to failure to reach target heart rate. No ischemic changes seen to 79% heart rate. Good exercise tolerance exercising 9 minutes on [place] protocol. Radiology Review:  Pertinent images / reports were reviewed as a part of this visit and reveals the following:    Echo: 2/7/15  Normal left ventricle size, wall thickness and systolic function with an estimated ejection fraction of 55%. Mild mitral regurgitation is present. There is mild tricuspid regurgitation with RVSP estimated at 28 mmHg. Stress Test: 2/13/15  Nondiagnostic GXT due to resting ST segment abnormalities. Fair exercise tolerance. No exercise induced chest pain.      Angiogram: 2/6/15  [facility] SUMMARY  ~LM normal  ~LAD 50% proximal, D1 30% proximal  ~Cx Normal, OM2 90% sequential lesions in <2mm vessel  ~RCA 80% mid(IVUS)  ~LVG Initial 45% with anteroapical hypokinesis  After intervention 55% with resolution of anteroapical hypokinesis    Impression  ~Angiography w/ obstructive RCA disease  ~LVEDP   ~LVG with     Intervention  ~PCI of RCA with 3.1F45QJV postdilated to 3.75  ~IVUS of RCA - 80% mid without clot  ~IVUS LAD - 50% proximal, no evidence clot  ~IVUS of D1 - 30% proximal, no clear evidence clot    GXT juve 12/8/16:  There is normal isotope uptake at stress and rest. There is no evidence of    myocardial ischemia or scar.    Normal LV function.    Overall findings represent a low risk scan. Assessment:  1. Coronary artery disease involving native coronary artery of native heart without angina pectoris    2. Cardiac arrest ([address]    3. Other hyperlipidemia    4. Tobacco abuse counseling    5. SOB (shortness of breath)        1. CAD: Stable, no anginal symptoms. Needs stress test periodically. Plan for stress testing every other year/next summer. 2/2015 cath> PCI of RCA with [id number], RCA 80% but without clot, LAD 50%, D1 30%  Nuclear stress test 12/8/16> Normal  Stress Echo 8/8/19>  Non-diagnostic due to failure to reach target heart rate.   No ischemic changes seen to 79% heart rate.   Good exercise tolerance exercising 9 minutes on [place] protocol.  ~ Denies chest pain. Stress Echo 9/10/21> stable    2. Cardiac arrest: Ventricular fibrillation at time of MI. Ermalinda Kiss was recommended, but post revascularistion, EF returned to normal.    3. Hyperlipidemia: Stable on Crestor 20 mg.   4. Tobacco abuse counseling:  Smoking cessation discussed again       Plan:  Plan for stress testing every other year. Stress echo 9/10/21 read be me and discussed with the patient. Cardiac test and lab results personally reviewed by me during this office visit and discussed with patient for [address] soon- will fax to [facility]   No medication changes at this time. Continue risk factor modifications with heathy eating and moderate walking as exercise.    Call for any change in symptoms such as chest pain or shortness of breath. Try to stop smoking. Follow up in 6 months     Thank you for allowing to us to participate in the care of Oj Sharpe. David Crowell MD, [facility]    Patient's problem list, medications, allergies, past medical, surgical, social and family histories were reviewed and updated as appropriate. Scribe's attestation: This note was scribed in the presence of Dr. Michel Hagen MD, by Luz Maria Waddell RN. The scribe's documentation has been prepared under my direction and personally reviewed by me in its entirety. I confirm that the note above accurately reflects all work, treatment, procedures, and medical decision making performed by me.

## 2021-09-10 ENCOUNTER — TELEPHONE (OUTPATIENT)
Dept: CARDIOLOGY CLINIC | Age: 66
End: 2021-09-10

## 2021-09-10 ENCOUNTER — OFFICE VISIT (OUTPATIENT)
Dept: CARDIOLOGY CLINIC | Age: 66
End: 2021-09-10
Payer: MEDICARE

## 2021-09-10 ENCOUNTER — PROCEDURE VISIT (OUTPATIENT)
Dept: CARDIOLOGY CLINIC | Age: 66
End: 2021-09-10
Payer: MEDICARE

## 2021-09-10 VITALS
SYSTOLIC BLOOD PRESSURE: 138 MMHG | BODY MASS INDEX: 32.08 KG/M2 | DIASTOLIC BLOOD PRESSURE: 84 MMHG | WEIGHT: 250 LBS | HEART RATE: 70 BPM | OXYGEN SATURATION: 98 % | HEIGHT: 74 IN

## 2021-09-10 DIAGNOSIS — I25.10 CORONARY ARTERY DISEASE INVOLVING NATIVE CORONARY ARTERY OF NATIVE HEART WITHOUT ANGINA PECTORIS: ICD-10-CM

## 2021-09-10 DIAGNOSIS — I46.9 CARDIAC ARREST (HCC): ICD-10-CM

## 2021-09-10 DIAGNOSIS — R06.02 SOB (SHORTNESS OF BREATH): ICD-10-CM

## 2021-09-10 DIAGNOSIS — Z71.6 TOBACCO ABUSE COUNSELING: ICD-10-CM

## 2021-09-10 DIAGNOSIS — E78.49 OTHER HYPERLIPIDEMIA: ICD-10-CM

## 2021-09-10 DIAGNOSIS — I25.10 CORONARY ARTERY DISEASE INVOLVING NATIVE CORONARY ARTERY OF NATIVE HEART WITHOUT ANGINA PECTORIS: Primary | ICD-10-CM

## 2021-09-10 LAB
ALBUMIN SERPL-MCNC: 4 G/DL (ref 3.5–5)
ALP BLD-CCNC: 74 IU/L (ref 40–129)
ALT SERPL-CCNC: 19 IU/L (ref 10–50)
ANION GAP SERPL CALCULATED.3IONS-SCNC: 13 MMOL/L (ref 6–18)
AST SERPL-CCNC: 16 IU/L (ref 10–50)
B-TYPE NATRIURETIC PEPTIDE: 57 PG/ML (ref 0–72)
BILIRUB SERPL-MCNC: 0.4 MG/DL (ref 0–1)
BUN BLDV-MCNC: 11 MG/DL (ref 8–26)
CALCIUM SERPL-MCNC: 9.5 MG/DL (ref 8.8–10.1)
CHLORIDE BLD-SCNC: 103 MEQ/L (ref 98–111)
CHOLESTEROL, TOTAL: 130 MG/DL
CO2: 22 MMOL/L (ref 21–31)
CREAT SERPL-MCNC: 0.78 MG/DL (ref 0.44–1.03)
GFR AFRICAN AMERICAN: 106 ML/MIN/1.73 M2
GFR NON-AFRICAN AMERICAN: 91 ML/MIN/1.73 M2
GLUCOSE BLD-MCNC: 102 MG/DL (ref 70–99)
HCT VFR BLD CALC: 44.8 % (ref 40–50)
HDLC SERPL-MCNC: 44 MG/DL
HEMOGLOBIN: 15.2 G/DL (ref 13.5–16.5)
LDL CHOLESTEROL CALCULATED: 67 MG/DL
LV EF: 50 %
LVEF MODALITY: NORMAL
MCH RBC QN AUTO: 29.5 PG (ref 27–33)
MCHC RBC AUTO-ENTMCNC: 33.8 G/DL (ref 32–36)
MCV RBC AUTO: 87.2 FL (ref 82–97)
NONHDLC SERPL-MCNC: 86 MG/DL
PDW BLD-RTO: 13.9 %
PLATELET # BLD: 243 THOU/MCL (ref 140–375)
PMV BLD AUTO: 8.1 FL (ref 7.4–11.5)
POTASSIUM SERPL-SCNC: 4.3 MEQ/L (ref 3.6–5.1)
RBC # BLD: 5.14 MIL/MCL (ref 4.4–5.8)
SODIUM BLD-SCNC: 138 MEQ/L (ref 135–145)
TOTAL PROTEIN: 6.6 G/DL (ref 6.6–8.7)
TRIGL SERPL-MCNC: 93 MG/DL
WBC # BLD: 8.5 THOU/MCL (ref 3.6–10.5)

## 2021-09-10 PROCEDURE — G8427 DOCREV CUR MEDS BY ELIG CLIN: HCPCS | Performed by: INTERNAL MEDICINE

## 2021-09-10 PROCEDURE — 99214 OFFICE O/P EST MOD 30 MIN: CPT | Performed by: INTERNAL MEDICINE

## 2021-09-10 PROCEDURE — G8417 CALC BMI ABV UP PARAM F/U: HCPCS | Performed by: INTERNAL MEDICINE

## 2021-09-10 PROCEDURE — 93325 DOPPLER ECHO COLOR FLOW MAPG: CPT | Performed by: INTERNAL MEDICINE

## 2021-09-10 PROCEDURE — 93351 STRESS TTE COMPLETE: CPT | Performed by: INTERNAL MEDICINE

## 2021-09-10 PROCEDURE — 1123F ACP DISCUSS/DSCN MKR DOCD: CPT | Performed by: INTERNAL MEDICINE

## 2021-09-10 PROCEDURE — 4004F PT TOBACCO SCREEN RCVD TLK: CPT | Performed by: INTERNAL MEDICINE

## 2021-09-10 PROCEDURE — 3017F COLORECTAL CA SCREEN DOC REV: CPT | Performed by: INTERNAL MEDICINE

## 2021-09-10 PROCEDURE — 93320 DOPPLER ECHO COMPLETE: CPT | Performed by: INTERNAL MEDICINE

## 2021-09-10 PROCEDURE — 4040F PNEUMOC VAC/ADMIN/RCVD: CPT | Performed by: INTERNAL MEDICINE

## 2021-09-10 NOTE — TELEPHONE ENCOUNTER
----- Message from Roland Bryant MD sent at 9/10/2021  3:27 PM EDT -----  Let him know that labs look great

## 2021-09-10 NOTE — TELEPHONE ENCOUNTER
I spoke to patient with lab results per Dr Tommy Aguilar. Patient verbalized understanding. Advised patient to call back with any questions.

## 2021-10-27 ENCOUNTER — TELEPHONE (OUTPATIENT)
Dept: CARDIOLOGY CLINIC | Age: 66
End: 2021-10-27

## 2021-10-27 NOTE — TELEPHONE ENCOUNTER
Pt calling he needs his Lab Results sent to PCP Dr Wilner Turpin office today he has to get his DOT by 4:30 pm. Pls call to advise Thank you

## 2021-12-06 RX ORDER — CLOPIDOGREL BISULFATE 75 MG/1
TABLET ORAL
Qty: 90 TABLET | Refills: 3 | Status: SHIPPED | OUTPATIENT
Start: 2021-12-06

## 2021-12-06 NOTE — TELEPHONE ENCOUNTER
Received refill request for Plavix from  [place].     Last ov: 09/10/2021 LES    Last labs: 09/10/2021 CBC    Last Refill: 12/08/2020 #90 w/ 3 refills    Next appointment: 03/09/2022 LES (recall)

## 2022-03-17 NOTE — PROGRESS NOTES
(Site: Left Upper Arm, Position: Sitting, Cuff Size: Large Adult)   Pulse 69   Ht 6' 1\" (1.854 m)   Wt 248 lb (112.5 kg)   SpO2 98%   BMI 32.72 kg/m²       CONSTITUTIONAL: Cooperative, no apparent distress, and appears well nourished / developed  NEUROLOGIC:  Awake and orientated to person, place and time. PSYCH: Calm affect. SKIN: Warm and dry. HEENT: Sclera non-icteric, normocephalic, neck supple, no elevation of JVP, normal carotid pulses with no bruits and thyroid normal size. LUNGS:  No increased work of breathing and clear to auscultation, no crackles or wheezing. CARDIOVASCULAR:  Regular rate and rhythm with no murmurs, gallops, rubs, or abnormal heart sounds, normal PMI. The apical impulses not displaced. Heart tones are crisp and normal.  Cervical veins are not engorged. JVP less than 8 cm H2O. The carotid upstroke is normal in amplitude and contour without delay or bruit   ABDOMEN:  Normal bowel sounds, non-distended and non-tender to palpation  EXT: No edema, no calf tenderness. Pulses are present bilaterally. DATA:    8-8-19  Stress Echo  Non-diagnostic due to failure to reach target heart rate. No ischemic changes seen to 79% heart rate. Good exercise tolerance exercising 9 minutes on [place] protocol. Radiology Review:  Pertinent images / reports were reviewed as a part of this visit and reveals the following:    Echo: 2/7/15  Normal left ventricle size, wall thickness and systolic function with an estimated ejection fraction of 55%. Mild mitral regurgitation is present. There is mild tricuspid regurgitation with RVSP estimated at 28 mmHg. Stress Test: 2/13/15  Nondiagnostic GXT due to resting ST segment abnormalities. Fair exercise tolerance. No exercise induced chest pain.      Angiogram: 2/6/15  [facility] SUMMARY  ~LM normal  ~LAD 50% proximal, D1 30% proximal  ~Cx Normal, OM2 90% sequential lesions in <2mm vessel  ~RCA 80% mid(IVUS)  ~LVG Initial 45% with anteroapical hypokinesis  After intervention 55% with resolution of anteroapical hypokinesis    Impression  ~Angiography w/ obstructive RCA disease  ~LVEDP   ~LVG with     Intervention  ~PCI of RCA with [id number] postdilated to 3.75  ~IVUS of RCA - 80% mid without clot  ~IVUS LAD - 50% proximal, no evidence clot  ~IVUS of D1 - 30% proximal, no clear evidence clot    GXT juve 12/8/16:  There is normal isotope uptake at stress and rest. There is no evidence of    myocardial ischemia or scar.    Normal LV function.    Overall findings represent a low risk scan. Assessment:      1. CAD: Stable, no anginal symptoms. Needs stress test periodically. Plan for stress testing every other year/next summer. 2/2015 cath> PCI of RCA with 3.0X77LTQ, RCA 80% but without clot, LAD 50%, D1 30%  Nuclear stress test 12/8/16> Normal  Stress Echo 8/8/19>  Non-diagnostic due to failure to reach target heart rate.   No ischemic changes seen to 79% heart rate.   Good exercise tolerance exercising 9 minutes on [place] protocol.  ~ Denies chest pain. Stress Echo 9/10/21> stable  CT Lung Screen 2/11/2022  Stable borderline ascending aortic ectasia measuring 4 cm in transverse diameter.  Aortic atherosclerotic calcification is present. CHEST WALL/LOWER NECK:  Unremarkable        2. Cardiac arrest: Ventricular fibrillation at time of MI. Lifevest was recommended, but post revascularistion, EF returned to normal.   Pro BNP 9/10/2021 - 57     3. Hyperlipidemia: Stable on Crestor 20 mg. Lipid Panel 9/10/2021 , TG 93, HDL 44, LDL 67   4. Tobacco abuse counseling:  Smoking cessation discussed again   5. CT Lung Screen 2/11/2022  Stable borderline ascending aortic ectasia measuring 4 cm in transverse diameter.      Plan:  Plan for stress testing every other year. Stress echo 9/10/21 read be me and discussed with the patient.    Cardiac test and lab results personally reviewed by me during this office visit and discussed with patient for [facility] of abdomen aorta to be scheduled prior to next visit - 2/11/2022 CT Lung ascending aortic ectasia measuring 4 cm in transverse diameter. No medication changes at this time. Continue risk factor modifications with heathy eating and moderate walking as exercise. Call for any change in symptoms such as chest pain or shortness of breath. Try to stop smoking. Smoking cessation once again advised  Follow up in 6 months     Thank you for allowing to us to participate in the care of Sudheer Karen. Loni Nielson MD, [facility]    Patient's problem list, medications, allergies, past medical, surgical, social and family histories were reviewed and updated as appropriate. Scribe's Attestation: This note was scribed in the presence of Dr. Roman Wilkins MD by Angle Masters RN. @Today     The scribe's documentation has been prepared under my direction and personally reviewed by me in its entirety. I confirm that the note above accurately reflects all work, treatment, procedures, and medical decision making performed by me. The scribe's documentation has been prepared under my direction and personally reviewed by me in its entirety. I confirm that the note above accurately reflects all work, treatment, procedures, and medical decision making performed by me.

## 2022-03-25 ENCOUNTER — OFFICE VISIT (OUTPATIENT)
Dept: CARDIOLOGY CLINIC | Age: 67
End: 2022-03-25
Payer: MEDICARE

## 2022-03-25 VITALS
HEART RATE: 69 BPM | SYSTOLIC BLOOD PRESSURE: 118 MMHG | DIASTOLIC BLOOD PRESSURE: 70 MMHG | BODY MASS INDEX: 32.87 KG/M2 | OXYGEN SATURATION: 98 % | WEIGHT: 248 LBS | HEIGHT: 73 IN

## 2022-03-25 DIAGNOSIS — E78.49 OTHER HYPERLIPIDEMIA: ICD-10-CM

## 2022-03-25 DIAGNOSIS — Z71.6 TOBACCO ABUSE COUNSELING: ICD-10-CM

## 2022-03-25 DIAGNOSIS — I77.89 ENLARGED AORTA (HCC): ICD-10-CM

## 2022-03-25 DIAGNOSIS — I25.10 CAD IN NATIVE ARTERY: Primary | ICD-10-CM

## 2022-03-25 PROCEDURE — G8417 CALC BMI ABV UP PARAM F/U: HCPCS | Performed by: INTERNAL MEDICINE

## 2022-03-25 PROCEDURE — 99214 OFFICE O/P EST MOD 30 MIN: CPT | Performed by: INTERNAL MEDICINE

## 2022-03-25 PROCEDURE — 3017F COLORECTAL CA SCREEN DOC REV: CPT | Performed by: INTERNAL MEDICINE

## 2022-03-25 PROCEDURE — 4040F PNEUMOC VAC/ADMIN/RCVD: CPT | Performed by: INTERNAL MEDICINE

## 2022-03-25 PROCEDURE — 4004F PT TOBACCO SCREEN RCVD TLK: CPT | Performed by: INTERNAL MEDICINE

## 2022-03-25 PROCEDURE — G8484 FLU IMMUNIZE NO ADMIN: HCPCS | Performed by: INTERNAL MEDICINE

## 2022-03-25 PROCEDURE — 1123F ACP DISCUSS/DSCN MKR DOCD: CPT | Performed by: INTERNAL MEDICINE

## 2022-03-25 PROCEDURE — G8427 DOCREV CUR MEDS BY ELIG CLIN: HCPCS | Performed by: INTERNAL MEDICINE

## 2022-03-25 RX ORDER — ROSUVASTATIN CALCIUM 20 MG/1
20 TABLET, COATED ORAL DAILY
Qty: 90 TABLET | Refills: 3 | Status: SHIPPED | OUTPATIENT
Start: 2022-03-25

## 2022-08-25 NOTE — PROGRESS NOTES
[facility], [place]   Cardiac Follow Up    Chief Complaint   Patient presents with    Coronary Artery Disease    Hyperlipidemia    6 Month Follow-Up         History of Present Illness:  Mr. Maria Luisa Lemus is a 75 y.o. male. His history includes an admission to [facility] in 2015 with complaints of chest pain and shortness of  breath for approximately 2 weeks. His EKG showed inferior ischemia, and patient subsequently had Vfib arrest. He was shocked twice. CPR was administered with restoration of normal sinus rhythm. He was transferred to [facility] and taken emergently to the cardiac catheterization lab which showed the left main to be normal, LAD 50%, proximal D1 30%, proximal circumflex normal, OM2 90% sequential lesions and less than 2 mm vessels, RCA 80% mid. LV gram initially showed an EF of 45% with anterior apical hypokinesis. After intervention, EF improved to 55% with resolution of anterior apical hypokinesis. He had a PCI of the RCA using a 3.5 x 23mm drug-eluting stent postdilated to 3.75, and IVUS to the RCA 80% mid without clot. Due to ventricular fibrillation in the presence of his MI,  LifeVest versus medical treatment with Holter monitoring and a plain stress later were discussed. In the week after discharge ACE inhibitor was held. LV function was normal. He decided not to pursue a LifeVest.  He wore a holter monitor later in 2015 and it showed no lifethreatening arrhythmias. Last OV (3/2022) he stated his brother had  from his aorta rupturing. Today he is here for 6 mos follow up. He fully retired about 2 weeks ago. He feels \"wonderful. \" Pt denies exertional chest pain, WHITEHEAD/PND, palpitations, light-headedness, edema. He has property in [place], about 130 acres, that he maintains. His family likes to camp and fish on the property. He has one grandson.       Past Medical History:  Past Medical History:   Diagnosis Date    Hyperlipidemia      Past Surgical History:  Past Surgical History:   Procedure Laterality Date    APPENDECTOMY      CATARACT REMOVAL WITH IMPLANT Bilateral 10/2020    CORONARY ANGIOPLASTY WITH STENT PLACEMENT  2/2015    HEMORRHOID SURGERY       Social History:    Social History     Tobacco Use   Smoking Status Some Days    Packs/day: 1.00    Years: 45.00    Pack years: 45.00    Types: Cigars, Cigarettes   Smokeless Tobacco Never   Tobacco Comments    1 pack per week      Current Medications:  Current Outpatient Medications   Medication Sig Dispense Refill    Tadalafil (CIALIS PO) Take by mouth      rosuvastatin (CRESTOR) 20 MG tablet Take 1 tablet by mouth daily 90 tablet 3    clopidogrel (PLAVIX) 75 MG tablet TAKE ONE TABLET BY MOUTH DAILY 90 tablet 3    nitroGLYCERIN (NITROSTAT) 0.4 MG SL tablet Place 1 tablet under the tongue every 5 minutes as needed for Chest pain 25 tablet 1    colchicine (COLCRYS) 0.6 MG tablet       vitamin B-12 (CYANOCOBALAMIN) 500 MCG tablet Take 500 mcg by mouth daily      aspirin 81 MG chewable tablet Take 1 tablet by mouth daily. 30 tablet 3     No current facility-administered medications for this visit. REVIEW OF SYSTEMS:   CONSTITUTIONAL: No major weight gain or loss, fatigue, weakness, night sweats or fever. There's been no change in energy level, sleep pattern, or activity level. HEENT: No new vision difficulties or ringing in the ears. RESPIRATORY: No new SOB, PND, orthopnea or cough. CARDIOVASCULAR: See HPI  GI: No nausea, vomiting, diarrhea, constipation, abdominal pain or changes in bowel habits. : No urinary frequency, urgency, incontinence hematuria or dysuria. SKIN: No cyanosis or skin lesions. MUSCULOSKELETAL: No new muscle or joint pain. NEUROLOGICAL: No syncope or TIA-like symptoms.   PSYCHIATRIC: No anxiety, pain, insomnia or depression    PHYSICAL EXAM:        VITALS:    /72 (Site: Left Upper Arm, Position: Sitting, Cuff Size: Medium Adult)   Pulse 65   Ht 6' 2\" (1.88 m) Wt 247 lb 11.2 oz (112.4 kg)   SpO2 97%   BMI 31.80 kg/m²       CONSTITUTIONAL: Cooperative, no apparent distress, and appears well nourished / developed  NEUROLOGIC:  Awake and orientated to person, place and time. PSYCH: Calm affect. SKIN: Warm and dry. HEENT: Sclera non-icteric, normocephalic, neck supple, no elevation of JVP, normal carotid pulses with no bruits and thyroid normal size. LUNGS:  No increased work of breathing and clear to auscultation, no crackles or wheezing. CARDIOVASCULAR:  Regular rate and rhythm with no murmurs, gallops, rubs, or abnormal heart sounds, normal PMI. The apical impulses not displaced. Heart tones are crisp and normal.  Cervical veins are not engorged. JVP less than 8 cm H2O. The carotid upstroke is normal in amplitude and contour without delay or bruit   ABDOMEN:  Normal bowel sounds, non-distended and non-tender to palpation  EXT: No edema, no calf tenderness. Pulses are present bilaterally. DATA:    8-8-19  Stress Echo  Non-diagnostic due to failure to reach target heart rate. No ischemic changes seen to 79% heart rate. Good exercise tolerance exercising 9 minutes on [place] protocol. Radiology Review:  Pertinent images / reports were reviewed as a part of this visit and reveals the following:    Echo: 2/7/15  Normal left ventricle size, wall thickness and systolic function with an estimated ejection fraction of 55%. Mild mitral regurgitation is present. There is mild tricuspid regurgitation with RVSP estimated at 28 mmHg. Stress Test: 2/13/15  Nondiagnostic GXT due to resting ST segment abnormalities. Fair exercise tolerance. No exercise induced chest pain.      Angiogram: 2/6/15  [facility] SUMMARY  ~LM normal  ~LAD 50% proximal, D1 30% proximal  ~Cx Normal, OM2 90% sequential lesions in <2mm vessel  ~RCA 80% mid(IVUS)  ~LVG Initial 45% with anteroapical hypokinesis  After intervention 55% with resolution of anteroapical hypokinesis    Impression  ~Angiography w/ obstructive RCA disease  ~LVEDP   ~LVG with     Intervention  ~PCI of RCA with 3.0L59MNW postdilated to 3.75  ~IVUS of RCA - 80% mid without clot  ~IVUS LAD - 50% proximal, no evidence clot  ~IVUS of D1 - 30% proximal, no clear evidence clot    GXT juve 12/8/16:  There is normal isotope uptake at stress and rest. There is no evidence of    myocardial ischemia or scar. Normal LV function. Overall findings represent a low risk scan. Assessment:      1. CAD:   Stable,   denies anginal symptoms     Needs stress test periodically. Plan for stress testing every other year/next summer. 2/2015 cath> PCI of RCA with 3.0D77THU, RCA 80% but without clot, LAD 50%, D1 30%  Nuclear stress test 12/8/16> Normal  Stress Echo 8/8/19>  Non-diagnostic due to failure to reach target heart rate. No ischemic changes seen to 79% heart rate. Good exercise tolerance exercising 9 minutes on [place] protocol.  ~ Denies chest pain. Stress Echo 9/10/21> stable  CT Lung Screen 2/11/2022  Stable borderline ascending aortic ectasia measuring 4 cm in transverse diameter. Aortic atherosclerotic calcification is present. CHEST WALL/LOWER NECK:  Unremarkable        2. Cardiac arrest:   Ventricular fibrillation at time of MI. Lifevest was recommended, but post revascularistion, EF returned to normal.   Pro BNP 9/10/2021 - 57     3. Hyperlipidemia:   Stable   Continue on Crestor 20 mg. Lipid Panel 9/10/2021 > , TG 93, HDL 44, LDL 67                     9/7/22  >   HDL 46 LDL 78     4. Tobacco abuse counseling:    Smoking cessation discussed, he reports that he continues to smoke     5. CT Lung Screen 2/11/2022            Stable borderline ascending aortic ectasia measuring 4 cm in transverse diameter. Plan:  Plan for stress testing every other year. Cardiac test and lab results personally reviewed by me during this office visit and discussed.      No medication changes  Continue risk factor modifications. Call for any change in symptoms, call to report any changes in shortness of breath or development of chest pain with activity. Follow up in 6 mos      Thank you for allowing to us to participate in the care of Vonnie Chavez. Glenny Franco MD, [facility]    Patient's problem list, medications, allergies, past medical, surgical, social and family histories were reviewed and updated as appropriate. Scribe's attestation: This note was scribed in the presence of Dr Nino Bai by Lior Champion RN. The scribe's documentation has been prepared under my direction and personally reviewed by me in its entirety. I confirm that the note above accurately reflects all work, treatment, procedures, and medical decision making performed by me.

## 2022-09-07 DIAGNOSIS — E78.49 OTHER HYPERLIPIDEMIA: Primary | ICD-10-CM

## 2022-09-07 DIAGNOSIS — R06.02 SOB (SHORTNESS OF BREATH): ICD-10-CM

## 2022-09-07 DIAGNOSIS — I25.10 CAD IN NATIVE ARTERY: ICD-10-CM

## 2022-09-07 LAB
ALBUMIN SERPL-MCNC: 4 G/DL (ref 3.5–5)
ALP BLD-CCNC: 92 IU/L (ref 40–129)
ALT SERPL-CCNC: 16 IU/L (ref 10–50)
ANION GAP SERPL CALCULATED.3IONS-SCNC: 9 MMOL/L (ref 6–18)
AST SERPL-CCNC: 15 IU/L (ref 10–50)
B-TYPE NATRIURETIC PEPTIDE: 142 PG/ML (ref 0–72)
BILIRUB SERPL-MCNC: 0.3 MG/DL (ref 0–1)
BUN BLDV-MCNC: 14 MG/DL (ref 8–26)
CALCIUM SERPL-MCNC: 9.4 MG/DL (ref 8.8–10.1)
CHLORIDE BLD-SCNC: 103 MEQ/L (ref 98–111)
CHOLESTEROL, TOTAL: 144 MG/DL
CO2: 25 MMOL/L (ref 21–31)
CREAT SERPL-MCNC: 0.98 MG/DL (ref 0.64–1.27)
EGFR (CKD-EPI): 84 ML/MIN/1.73 M2
GLUCOSE BLD-MCNC: 106 MG/DL (ref 70–99)
HCT VFR BLD CALC: 44.8 % (ref 40–50)
HDLC SERPL-MCNC: 46 MG/DL
HEMOGLOBIN: 14.8 G/DL (ref 13.5–16.5)
LDL CHOLESTEROL CALCULATED: 78 MG/DL
MCH RBC QN AUTO: 29 PG (ref 27–33)
MCHC RBC AUTO-ENTMCNC: 33.1 G/DL (ref 32–36)
MCV RBC AUTO: 87.7 FL (ref 82–97)
NONHDLC SERPL-MCNC: 98 MG/DL
PDW BLD-RTO: 14.4 %
PLATELET # BLD: 260 THOU/MCL (ref 140–375)
PMV BLD AUTO: 8.5 FL (ref 7.4–11.5)
POTASSIUM SERPL-SCNC: 4.7 MEQ/L (ref 3.6–5.1)
RBC # BLD: 5.11 MIL/MCL (ref 4.4–5.8)
SODIUM BLD-SCNC: 137 MEQ/L (ref 135–145)
TOTAL PROTEIN: 6.8 G/DL (ref 6.6–8.7)
TRIGL SERPL-MCNC: 101 MG/DL
WBC # BLD: 8 THOU/MCL (ref 3.6–10.5)

## 2022-09-09 ENCOUNTER — OFFICE VISIT (OUTPATIENT)
Dept: CARDIOLOGY CLINIC | Age: 67
End: 2022-09-09
Payer: MEDICARE

## 2022-09-09 VITALS
DIASTOLIC BLOOD PRESSURE: 72 MMHG | OXYGEN SATURATION: 97 % | SYSTOLIC BLOOD PRESSURE: 132 MMHG | HEART RATE: 65 BPM | HEIGHT: 74 IN | BODY MASS INDEX: 31.79 KG/M2 | WEIGHT: 247.7 LBS

## 2022-09-09 DIAGNOSIS — Z71.6 TOBACCO ABUSE COUNSELING: ICD-10-CM

## 2022-09-09 DIAGNOSIS — E78.49 OTHER HYPERLIPIDEMIA: ICD-10-CM

## 2022-09-09 DIAGNOSIS — I25.10 CAD IN NATIVE ARTERY: Primary | ICD-10-CM

## 2022-09-09 PROCEDURE — 1123F ACP DISCUSS/DSCN MKR DOCD: CPT | Performed by: INTERNAL MEDICINE

## 2022-09-09 PROCEDURE — 3017F COLORECTAL CA SCREEN DOC REV: CPT | Performed by: INTERNAL MEDICINE

## 2022-09-09 PROCEDURE — 99214 OFFICE O/P EST MOD 30 MIN: CPT | Performed by: INTERNAL MEDICINE

## 2022-09-09 PROCEDURE — G8417 CALC BMI ABV UP PARAM F/U: HCPCS | Performed by: INTERNAL MEDICINE

## 2022-09-09 PROCEDURE — G8427 DOCREV CUR MEDS BY ELIG CLIN: HCPCS | Performed by: INTERNAL MEDICINE

## 2022-09-09 PROCEDURE — 4004F PT TOBACCO SCREEN RCVD TLK: CPT | Performed by: INTERNAL MEDICINE

## 2022-09-09 NOTE — PATIENT INSTRUCTIONS
Plan for stress testing every other year. Continue risk factor modifications. Call for any change in symptoms, call to report any changes in shortness of breath or development of chest pain with activity.     Follow up in 6 mos

## 2022-09-27 ENCOUNTER — TELEPHONE (OUTPATIENT)
Dept: CARDIOLOGY CLINIC | Age: 67
End: 2022-09-27

## 2022-09-27 NOTE — TELEPHONE ENCOUNTER
Pt called in stating that he is trying to get signed up for Cardiac Clearance at   [facility]. Pt stated he was told he needed a referral sent over.     Pt an be reached at [phone]

## 2022-09-29 NOTE — TELEPHONE ENCOUNTER
Called patient he stated he needs a referral for Cardiac Rehab Maintenance at [facility]. Please advise.

## 2022-10-03 DIAGNOSIS — I25.83 CORONARY ARTERY DISEASE DUE TO LIPID RICH PLAQUE: ICD-10-CM

## 2022-10-03 DIAGNOSIS — Z95.5 HISTORY OF HEART ARTERY STENT: Primary | ICD-10-CM

## 2022-10-03 DIAGNOSIS — I25.10 CORONARY ARTERY DISEASE DUE TO LIPID RICH PLAQUE: ICD-10-CM

## 2022-10-04 ENCOUNTER — TELEPHONE (OUTPATIENT)
Dept: CARDIOLOGY CLINIC | Age: 67
End: 2022-10-04

## 2022-10-04 NOTE — TELEPHONE ENCOUNTER
Yanira called inform Jacek Mcconnell that she was able to get into the Pt file and found the referral.  Please advise

## 2022-12-30 ENCOUNTER — TELEPHONE (OUTPATIENT)
Dept: CARDIOLOGY CLINIC | Age: 67
End: 2022-12-30

## 2022-12-30 NOTE — TELEPHONE ENCOUNTER
Scheduled patient for 6 mo follow up in March 2023. Patient wants to know if he has enough refills on his medications to last until this appointment. Please advise.

## 2022-12-30 NOTE — TELEPHONE ENCOUNTER
Spoke with patient about refills to let him know Jose Vasquez would send a request when his Rx is out. Voiced understanding.

## 2023-02-02 NOTE — PROGRESS NOTES
[facility]   Cardiac Follow Up    Chief Complaint   Patient presents with    Coronary Artery Disease    Hyperlipidemia    6 Month Follow-Up         History of Present Illness:  Mr. Tarah Phillips is a 75 y.o. male. His history includes an admission to [facility] in 2015 with complaints of chest pain and shortness of  breath for approximately 2 weeks. His EKG showed inferior ischemia, and patient subsequently had Vfib arrest. He was shocked twice. CPR was administered with restoration of normal sinus rhythm. He was transferred to [facility] and taken emergently to the cardiac catheterization lab which showed the left main to be normal, LAD 50%, proximal D1 30%, proximal circumflex normal, OM2 90% sequential lesions and less than 2 mm vessels, RCA 80% mid. LV gram initially showed an EF of 45% with anterior apical hypokinesis. After intervention, EF improved to 55% with resolution of anterior apical hypokinesis. He had a PCI of the RCA using a 3.5 x 23mm drug-eluting stent postdilated to 3.75, and IVUS to the RCA 80% mid without clot. Due to ventricular fibrillation in the presence of his MI,  LifeVest versus medical treatment with Holter monitoring and a plain stress later were discussed. In the week after discharge ACE inhibitor was held. LV function was normal. He decided not to pursue a LifeVest.  He wore a holter monitor later in 2015 and it showed no lifethreatening arrhythmias. Last OV (3/2022) he stated his brother had  from his aorta rupturing. Today he is here for 6 mos follow up. He is feeling well. Pt denies exertional chest pain, WHITEHEAD/PND, palpitations, light-headedness, edema. He is working on stopping smoking and decreasing use. He walks on the treadmill regularly at [place] at cardiac rehab (twice per week). He had labs drawn at [facility] this morning. He reports having a lung screening 2 weeks ago.       Past Medical History:  Past Medical History:   Diagnosis Date    Hyperlipidemia      Past Surgical History:  Past Surgical History:   Procedure Laterality Date    APPENDECTOMY      CATARACT REMOVAL WITH IMPLANT Bilateral 10/2020    CORONARY ANGIOPLASTY WITH STENT PLACEMENT  2/2015    HEMORRHOID SURGERY       Social History:    Social History     Tobacco Use   Smoking Status Some Days    Packs/day: 1.00    Years: 45.00    Pack years: 45.00    Types: Cigars, Cigarettes   Smokeless Tobacco Never   Tobacco Comments    1 pack per week      Current Medications:  Current Outpatient Medications   Medication Sig Dispense Refill    Tadalafil (CIALIS PO) Take 5 mg by mouth      rosuvastatin (CRESTOR) 20 MG tablet Take 1 tablet by mouth daily 90 tablet 3    clopidogrel (PLAVIX) 75 MG tablet TAKE ONE TABLET BY MOUTH DAILY 90 tablet 3    nitroGLYCERIN (NITROSTAT) 0.4 MG SL tablet Place 1 tablet under the tongue every 5 minutes as needed for Chest pain 25 tablet 1    colchicine (COLCRYS) 0.6 MG tablet       vitamin B-12 (CYANOCOBALAMIN) 500 MCG tablet Take 500 mcg by mouth daily      aspirin 81 MG chewable tablet Take 1 tablet by mouth daily. 30 tablet 3     No current facility-administered medications for this visit. REVIEW OF SYSTEMS:   CONSTITUTIONAL: No major weight gain or loss, fatigue, weakness, night sweats or fever. There's been no change in energy level, sleep pattern, or activity level. HEENT: No new vision difficulties or ringing in the ears. RESPIRATORY: No new SOB, PND, orthopnea or cough. CARDIOVASCULAR: See HPI  GI: No nausea, vomiting, diarrhea, constipation, abdominal pain or changes in bowel habits. : No urinary frequency, urgency, incontinence hematuria or dysuria. SKIN: No cyanosis or skin lesions. MUSCULOSKELETAL: No new muscle or joint pain. NEUROLOGICAL: No syncope or TIA-like symptoms.   PSYCHIATRIC: No anxiety, pain, insomnia or depression    PHYSICAL EXAM:        VITALS:    /80 (Site: Left Upper Arm, Position: Sitting, Cuff Size: Medium Adult)   Pulse 65   Ht 6' 2\" (1.88 m)   Wt 251 lb 9.6 oz (114.1 kg)   SpO2 98%   BMI 32.30 kg/m²       CONSTITUTIONAL: Cooperative, no apparent distress, and appears well nourished / developed  NEUROLOGIC:  Awake and orientated to person, place and time. PSYCH: Calm affect. SKIN: Warm and dry. HEENT: Sclera non-icteric, normocephalic, neck supple, no elevation of JVP, normal carotid pulses with no bruits and thyroid normal size. LUNGS:  No increased work of breathing and clear to auscultation, no crackles or wheezing. CARDIOVASCULAR:  Regular rate and rhythm with no murmurs, gallops, rubs, or abnormal heart sounds, normal PMI. The apical impulses not displaced. Heart tones are crisp and normal.  Cervical veins are not engorged. JVP less than 8 cm H2O. The carotid upstroke is normal in amplitude and contour without delay or bruit   ABDOMEN:  Normal bowel sounds, non-distended and non-tender to palpation  EXT: No edema, no calf tenderness. Pulses are present bilaterally. DATA:    8-8-19  Stress Echo  Non-diagnostic due to failure to reach target heart rate. No ischemic changes seen to 79% heart rate. Good exercise tolerance exercising 9 minutes on [place] protocol. Radiology Review:  Pertinent images / reports were reviewed as a part of this visit and reveals the following:    Echo: 2/7/15  Normal left ventricle size, wall thickness and systolic function with an estimated ejection fraction of 55%. Mild mitral regurgitation is present. There is mild tricuspid regurgitation with RVSP estimated at 28 mmHg. Stress Test: 2/13/15  Nondiagnostic GXT due to resting ST segment abnormalities. Fair exercise tolerance. No exercise induced chest pain.      Angiogram: 2/6/15  C SUMMARY  ~LM normal  ~LAD 50% proximal, D1 30% proximal  ~Cx Normal, OM2 90% sequential lesions in <2mm vessel  ~RCA 80% mid(IVUS)  ~LVG Initial 45% with anteroapical hypokinesis  After intervention 55% with resolution of anteroapical hypokinesis    Impression  ~Angiography w/ obstructive RCA disease  ~LVEDP   ~LVG with     Intervention  ~PCI of RCA with [id number] postdilated to 3.75  ~IVUS of RCA - 80% mid without clot  ~IVUS LAD - 50% proximal, no evidence clot  ~IVUS of D1 - 30% proximal, no clear evidence clot    GXT juve 12/8/16:  There is normal isotope uptake at stress and rest. There is no evidence of    myocardial ischemia or scar. Normal LV function. Overall findings represent a low risk scan. Assessment:      1. CAD:   Stable,   denies anginal symptoms      Needs stress test periodically. Plan for stress testing every other year/next summer. 2/2015 cath> PCI of RCA with [id number], RCA 80% but without clot, LAD 50%, D1 30%  Nuclear stress test 12/8/16> Normal  Stress Echo 8/8/19>  Non-diagnostic due to failure to reach target heart rate. No ischemic changes seen to 79% heart rate. Good exercise tolerance exercising 9 minutes on [place] protocol.  ~ Denies chest pain. Stress Echo 9/10/21> stable  CT Lung Screen 2/11/2022  Stable borderline ascending aortic ectasia measuring 4 cm in transverse diameter. Aortic atherosclerotic calcification is present. CHEST WALL/LOWER NECK:  Unremarkable     Continue asa/statin/Plavix  Plan for stress testing every other year. 2. Cardiac arrest:   Ventricular fibrillation at time of MI. Lifevest was recommended, but post revascularistion, EF returned to normal.   Pro BNP 9/10/2021 - 57  BNP 9/7/22 - 142     3. Hyperlipidemia:   Stable   Remain on Crestor 20 mg. Lipid Panel 9/10/2021 > , TG 93, HDL 44, LDL 67                     9/7/22  >   HDL 46 LDL 78     4. Tobacco abuse counseling:    Encouraged smoking cessation      5. CT Lung Screen 2/11/2022            Stable borderline ascending aortic ectasia measuring 4 cm in transverse diameter. Plan:  Plan for stress testing every other year. Cardiac test and lab results personally reviewed by me during this office visit and discussed. No medications changes  Continue plavix since pt still smoking   Continue risk factor modifications. Call for any change in symptoms, call to report any changes in shortness of breath or development of chest pain with activity. Follow up in 6 mos      Thank you for allowing to us to participate in the care of Grace Alvarado. Hugh Wade MD, [facility]    Patient's problem list, medications, allergies, past medical, surgical, social and family histories were reviewed and updated as appropriate. Scribe's attestation: This note was scribed in the presence of Dr Yvette Lobo by Tiesha Montoya RN. The scribe's documentation has been prepared under my direction and personally reviewed by me in its entirety. I confirm that the note above accurately reflects all work, treatment, procedures, and medical decision making performed by me.

## 2023-02-14 ENCOUNTER — HOSPITAL ENCOUNTER (OUTPATIENT)
Dept: CARDIAC REHAB | Age: 68
Discharge: HOME | End: 2023-02-14

## 2023-02-14 PROCEDURE — 9900000065 HC CARDIAC REHAB PHASE 3 - 1 VISIT

## 2023-02-16 ENCOUNTER — HOSPITAL ENCOUNTER (OUTPATIENT)
Dept: CARDIAC REHAB | Age: 68
Discharge: HOME | End: 2023-02-16

## 2023-02-16 PROCEDURE — 9900000065 HC CARDIAC REHAB PHASE 3 - 1 VISIT

## 2023-02-21 ENCOUNTER — HOSPITAL ENCOUNTER (OUTPATIENT)
Dept: CARDIAC REHAB | Age: 68
Discharge: HOME | End: 2023-02-21

## 2023-02-21 PROCEDURE — 9900000065 HC CARDIAC REHAB PHASE 3 - 1 VISIT

## 2023-02-23 ENCOUNTER — HOSPITAL ENCOUNTER (OUTPATIENT)
Dept: CARDIAC REHAB | Age: 68
Discharge: HOME | End: 2023-02-23

## 2023-02-23 PROCEDURE — 9900000065 HC CARDIAC REHAB PHASE 3 - 1 VISIT

## 2023-02-28 ENCOUNTER — HOSPITAL ENCOUNTER (OUTPATIENT)
Dept: CARDIAC REHAB | Age: 68
Discharge: HOME | End: 2023-02-28

## 2023-02-28 PROCEDURE — 9900000065 HC CARDIAC REHAB PHASE 3 - 1 VISIT

## 2023-03-02 ENCOUNTER — HOSPITAL ENCOUNTER (OUTPATIENT)
Dept: CARDIAC REHAB | Age: 68
Discharge: HOME | End: 2023-03-02

## 2023-03-02 PROCEDURE — 9900000065 HC CARDIAC REHAB PHASE 3 - 1 VISIT

## 2023-03-03 ENCOUNTER — OFFICE VISIT (OUTPATIENT)
Dept: CARDIOLOGY CLINIC | Age: 68
End: 2023-03-03
Payer: MEDICARE

## 2023-03-03 ENCOUNTER — TELEPHONE (OUTPATIENT)
Dept: CARDIOLOGY CLINIC | Age: 68
End: 2023-03-03

## 2023-03-03 VITALS
SYSTOLIC BLOOD PRESSURE: 128 MMHG | OXYGEN SATURATION: 98 % | BODY MASS INDEX: 32.29 KG/M2 | HEART RATE: 65 BPM | DIASTOLIC BLOOD PRESSURE: 80 MMHG | WEIGHT: 251.6 LBS | HEIGHT: 74 IN

## 2023-03-03 DIAGNOSIS — E78.49 OTHER HYPERLIPIDEMIA: ICD-10-CM

## 2023-03-03 DIAGNOSIS — I46.9 CARDIAC ARREST (HCC): ICD-10-CM

## 2023-03-03 DIAGNOSIS — I25.10 CAD IN NATIVE ARTERY: Primary | ICD-10-CM

## 2023-03-03 DIAGNOSIS — Z71.6 TOBACCO ABUSE COUNSELING: ICD-10-CM

## 2023-03-03 LAB
ALBUMIN SERPL-MCNC: 4.3 G/DL (ref 3.5–5)
ALP BLD-CCNC: 79 IU/L (ref 40–129)
ALT SERPL-CCNC: 20 IU/L (ref 10–50)
ANION GAP SERPL CALCULATED.3IONS-SCNC: 10 MMOL/L (ref 6–18)
AST SERPL-CCNC: 18 IU/L (ref 10–50)
B-TYPE NATRIURETIC PEPTIDE: 75 PG/ML (ref 0–72)
BILIRUB SERPL-MCNC: 0.4 MG/DL (ref 0–1)
BUN BLDV-MCNC: 16 MG/DL (ref 8–26)
CALCIUM SERPL-MCNC: 9.6 MG/DL (ref 8.8–10.1)
CHLORIDE BLD-SCNC: 103 MEQ/L (ref 98–111)
CHOLESTEROL, TOTAL: 139 MG/DL
CO2: 23 MMOL/L (ref 21–31)
CREAT SERPL-MCNC: 0.83 MG/DL (ref 0.64–1.27)
EGFR (CKD-EPI): 95 ML/MIN/1.73 M2
GLUCOSE BLD-MCNC: 120 MG/DL (ref 70–99)
HCT VFR BLD CALC: 45 % (ref 40–50)
HDLC SERPL-MCNC: 46 MG/DL
HEMOGLOBIN: 15.1 G/DL (ref 13.5–16.5)
LDL CHOLESTEROL CALCULATED: 77 MG/DL
MCH RBC QN AUTO: 29.5 PG (ref 27–33)
MCHC RBC AUTO-ENTMCNC: 33.5 G/DL (ref 32–36)
MCV RBC AUTO: 88 FL (ref 82–97)
NONHDLC SERPL-MCNC: 93 MG/DL
PDW BLD-RTO: 14 %
PLATELET # BLD: 246 THOU/MCL (ref 140–375)
PMV BLD AUTO: 8.4 FL (ref 7.4–11.5)
POTASSIUM SERPL-SCNC: 4.5 MEQ/L (ref 3.6–5.1)
RBC # BLD: 5.12 MIL/MCL (ref 4.4–5.8)
SODIUM BLD-SCNC: 136 MEQ/L (ref 135–145)
TOTAL PROTEIN: 7.2 G/DL (ref 6.6–8.7)
TRIGL SERPL-MCNC: 80 MG/DL
WBC # BLD: 9.5 THOU/MCL (ref 3.6–10.5)

## 2023-03-03 PROCEDURE — 1123F ACP DISCUSS/DSCN MKR DOCD: CPT | Performed by: INTERNAL MEDICINE

## 2023-03-03 PROCEDURE — 4004F PT TOBACCO SCREEN RCVD TLK: CPT | Performed by: INTERNAL MEDICINE

## 2023-03-03 PROCEDURE — G8484 FLU IMMUNIZE NO ADMIN: HCPCS | Performed by: INTERNAL MEDICINE

## 2023-03-03 PROCEDURE — G8417 CALC BMI ABV UP PARAM F/U: HCPCS | Performed by: INTERNAL MEDICINE

## 2023-03-03 PROCEDURE — G8427 DOCREV CUR MEDS BY ELIG CLIN: HCPCS | Performed by: INTERNAL MEDICINE

## 2023-03-03 PROCEDURE — 3017F COLORECTAL CA SCREEN DOC REV: CPT | Performed by: INTERNAL MEDICINE

## 2023-03-03 PROCEDURE — 99214 OFFICE O/P EST MOD 30 MIN: CPT | Performed by: INTERNAL MEDICINE

## 2023-03-03 NOTE — TELEPHONE ENCOUNTER
Spoke with patients wife per [employer] and advised her of the results below per LES. Patient voiced understanding. Call complete

## 2023-03-03 NOTE — PATIENT INSTRUCTIONS
No medication changes  Continue risk factor modifications. Call for any change in symptoms, call to report any changes in shortness of breath or development of chest pain with activity.     Follow up in 6 mos

## 2023-03-03 NOTE — TELEPHONE ENCOUNTER
----- Message from Tomas Pelayo MD sent at 3/3/2023 11:19 AM EST -----  Cholesterol profile very good. Labs good.  Blood sugar slightly above normal. You have mild insulin resistance so careful with sugar and carbohydrates

## 2023-03-07 ENCOUNTER — HOSPITAL ENCOUNTER (OUTPATIENT)
Dept: CARDIAC REHAB | Age: 68
Discharge: HOME | End: 2023-03-07

## 2023-03-07 PROCEDURE — 9900000065 HC CARDIAC REHAB PHASE 3 - 1 VISIT

## 2023-03-09 ENCOUNTER — HOSPITAL ENCOUNTER (OUTPATIENT)
Dept: CARDIAC REHAB | Age: 68
Discharge: HOME | End: 2023-03-09

## 2023-03-09 PROCEDURE — 9900000065 HC CARDIAC REHAB PHASE 3 - 1 VISIT

## 2023-03-14 ENCOUNTER — HOSPITAL ENCOUNTER (OUTPATIENT)
Dept: CARDIAC REHAB | Age: 68
Discharge: HOME | End: 2023-03-14

## 2023-03-14 PROCEDURE — 9900000065 HC CARDIAC REHAB PHASE 3 - 1 VISIT

## 2023-03-16 ENCOUNTER — HOSPITAL ENCOUNTER (OUTPATIENT)
Dept: CARDIAC REHAB | Age: 68
Discharge: HOME | End: 2023-03-16

## 2023-03-16 PROCEDURE — 9900000065 HC CARDIAC REHAB PHASE 3 - 1 VISIT

## 2023-03-21 ENCOUNTER — HOSPITAL ENCOUNTER (OUTPATIENT)
Dept: CARDIAC REHAB | Age: 68
Discharge: HOME | End: 2023-03-21

## 2023-03-21 PROCEDURE — 9900000065 HC CARDIAC REHAB PHASE 3 - 1 VISIT

## 2023-03-21 NOTE — TELEPHONE ENCOUNTER
Received refill request for  Rosuvastatin from [address] [place] [address].     Last ov: 03/03/2023 LES    Last Refill: 03/25/2022 #90 w/ 3 refills    Next appointment: 09/15/2023 LES

## 2023-03-22 RX ORDER — ROSUVASTATIN CALCIUM 20 MG/1
TABLET, COATED ORAL
Qty: 90 TABLET | Refills: 3 | Status: SHIPPED | OUTPATIENT
Start: 2023-03-22

## 2023-03-23 ENCOUNTER — HOSPITAL ENCOUNTER (OUTPATIENT)
Dept: CARDIAC REHAB | Age: 68
Discharge: HOME | End: 2023-03-23

## 2023-03-23 PROCEDURE — 9900000065 HC CARDIAC REHAB PHASE 3 - 1 VISIT

## 2023-03-28 ENCOUNTER — HOSPITAL ENCOUNTER (OUTPATIENT)
Dept: CARDIAC REHAB | Age: 68
Discharge: HOME | End: 2023-03-28

## 2023-03-28 PROCEDURE — 9900000065 HC CARDIAC REHAB PHASE 3 - 1 VISIT

## 2023-03-30 ENCOUNTER — HOSPITAL ENCOUNTER (OUTPATIENT)
Dept: CARDIAC REHAB | Age: 68
Discharge: HOME | End: 2023-03-30

## 2023-03-30 PROCEDURE — 9900000065 HC CARDIAC REHAB PHASE 3 - 1 VISIT

## 2023-05-11 ENCOUNTER — TELEPHONE (OUTPATIENT)
Dept: CARDIAC REHAB | Age: 68
End: 2023-05-11

## 2023-05-11 NOTE — TELEPHONE ENCOUNTER
Pt. Called and advised that he would be out of Cardiac rehab phase 3 for a while. He broke his ankle.   Advised to let us know when he can return and to bring a release from Orthopaedic doctor

## 2023-09-05 NOTE — PROGRESS NOTES
[facility]   Cardiac Follow Up    Chief Complaint   Patient presents with    Coronary Artery Disease    Hyperlipidemia    6 Month Follow-Up       History of Present Illness:  Mr. Carson Gilliland is a 70 y.o. male. His history includes an admission to [facility] in 2015 with complaints of chest pain and shortness of  breath for approximately 2 weeks. His EKG showed inferior ischemia, and patient subsequently had Vfib arrest. He was shocked twice. CPR was administered with restoration of normal sinus rhythm. He was transferred to [facility] and taken emergently to the cardiac catheterization lab which showed the left main to be normal, LAD 50%, proximal D1 30%, proximal circumflex normal, OM2 90% sequential lesions and less than 2 mm vessels, RCA 80% mid. LV gram initially showed an EF of 45% with anterior apical hypokinesis. After intervention, EF improved to 55% with resolution of anterior apical hypokinesis. He had a PCI of the RCA using a 3.5 x 23mm drug-eluting stent postdilated to 3.75, and IVUS to the RCA 80% mid without clot. Due to ventricular fibrillation in the presence of his MI,  LifeVest versus medical treatment with Holter monitoring and a plain stress later were discussed. In the week after discharge ACE inhibitor was held. LV function was normal. He decided not to pursue a LifeVest.  He wore a holter monitor later in 2015 and it showed no lifethreatening arrhythmias. Last OV (3/2022) he stated his brother had  from his aorta rupturing. Today he is here for 6 mos follow up. His wife  in April of this year. He broke his leg and had eye surgery this summer. He quit smoking 3-4 mos ago. At his wellness exam he had high bp but otherwise bp has been stable. Pt denies exertional chest pain, WHITEHEAD/PND, palpitations, light-headedness, edema. He walked 12,000 steps in [place] and tolerated very well.        Past Medical History:  Past Medical

## 2023-09-15 ENCOUNTER — OFFICE VISIT (OUTPATIENT)
Dept: CARDIOLOGY CLINIC | Age: 68
End: 2023-09-15
Payer: MEDICARE

## 2023-09-15 VITALS
BODY MASS INDEX: 32.11 KG/M2 | HEART RATE: 61 BPM | WEIGHT: 250.2 LBS | OXYGEN SATURATION: 98 % | HEIGHT: 74 IN | DIASTOLIC BLOOD PRESSURE: 64 MMHG | SYSTOLIC BLOOD PRESSURE: 126 MMHG

## 2023-09-15 DIAGNOSIS — E78.49 OTHER HYPERLIPIDEMIA: ICD-10-CM

## 2023-09-15 DIAGNOSIS — Z71.6 TOBACCO ABUSE COUNSELING: ICD-10-CM

## 2023-09-15 DIAGNOSIS — I25.10 CAD IN NATIVE ARTERY: Primary | ICD-10-CM

## 2023-09-15 DIAGNOSIS — I46.9 CARDIAC ARREST (HCC): ICD-10-CM

## 2023-09-15 DIAGNOSIS — I77.89 ENLARGED AORTA (HCC): ICD-10-CM

## 2023-09-15 PROCEDURE — 3017F COLORECTAL CA SCREEN DOC REV: CPT | Performed by: INTERNAL MEDICINE

## 2023-09-15 PROCEDURE — G8417 CALC BMI ABV UP PARAM F/U: HCPCS | Performed by: INTERNAL MEDICINE

## 2023-09-15 PROCEDURE — 1123F ACP DISCUSS/DSCN MKR DOCD: CPT | Performed by: INTERNAL MEDICINE

## 2023-09-15 PROCEDURE — 1036F TOBACCO NON-USER: CPT | Performed by: INTERNAL MEDICINE

## 2023-09-15 PROCEDURE — 99214 OFFICE O/P EST MOD 30 MIN: CPT | Performed by: INTERNAL MEDICINE

## 2023-09-15 PROCEDURE — G8427 DOCREV CUR MEDS BY ELIG CLIN: HCPCS | Performed by: INTERNAL MEDICINE

## 2023-09-15 NOTE — PATIENT INSTRUCTIONS
No medication changes  Labs before next office visit  Continue risk factor modifications. Call for any change in symptoms, call to report any changes in shortness of breath or development of chest pain with activity.     Follow up in 6 mos

## 2024-03-13 NOTE — PROGRESS NOTES
[facility]   Cardiac Follow Up    Chief Complaint   Patient presents with    6 Month Follow-Up    Coronary Artery Disease    Hyperlipidemia       History of Present Illness:  Mr. Mendez is a 68 y.o. male. His history includes an admission to [facility] in 2015 with complaints of chest pain and shortness of  breath for approximately 2 weeks.  His EKG showed inferior ischemia, and patient subsequently had Vfib arrest. He was shocked twice. CPR was administered with restoration of normal sinus rhythm. He was transferred to [facility] and taken emergently to the cardiac catheterization lab which showed the left main to be normal, LAD 50%, proximal D1 30%, proximal circumflex normal, OM2 90% sequential lesions and less than 2 mm vessels, RCA 80% mid. LV gram initially showed an EF of 45% with anterior apical hypokinesis. After intervention, EF improved to 55% with resolution of anterior apical hypokinesis. He had a PCI of the RCA using a 3.5 x 23mm drug-eluting stent postdilated to 3.75, and IVUS to the RCA 80% mid without clot.  Due to ventricular fibrillation in the presence of his MI,  LifeVest versus medical treatment with Holter monitoring and a plain stress later were discussed.  In the week after discharge ACE inhibitor was held. LV function was normal. He decided not to pursue a LifeVest.  He wore a holter monitor later in 2015 and it showed no lifethreatening arrhythmias.    Last OV (3/2022) he stated his brother had  from his aorta rupturing. His wife  in 2023.    Today he is here for 6 mos follow up. No new health concerns. Pt denies exertional chest pain, WHITEHEAD/PND, palpitations, light-headedness, edema. He has not needed to take any SL nitro. He has not smoked in almost 1 year, he quit \"cold turkey.\"  He has gained almost 10 lbs since quitting.  After he broke his leg, he did not return back to gym.     Past Medical History:  Past Medical

## 2024-04-01 ENCOUNTER — TELEPHONE (OUTPATIENT)
Dept: CARDIOLOGY CLINIC | Age: 69
End: 2024-04-01

## 2024-04-01 LAB
ALBUMIN SERPL-MCNC: 4.3 G/DL (ref 3.5–5.2)
ALP BLD-CCNC: 78 IU/L (ref 40–129)
ALT SERPL-CCNC: 25 IU/L (ref 10–40)
ANION GAP SERPL CALCULATED.3IONS-SCNC: 9 MMOL/L (ref 4–16)
AST SERPL-CCNC: 20 IU/L (ref 10–40)
BASOPHILS ABSOLUTE: 0.1 THOU/MCL (ref 0–0.2)
BASOPHILS ABSOLUTE: 1 %
BILIRUB SERPL-MCNC: 0.5 MG/DL (ref 0–1.2)
BUN BLDV-MCNC: 15 MG/DL (ref 8–26)
CALCIUM SERPL-MCNC: 9.7 MG/DL (ref 8.5–10.4)
CHLORIDE BLD-SCNC: 102 MEQ/L (ref 98–111)
CHOLESTEROL, TOTAL: 145 MG/DL
CO2: 27 MMOL/L (ref 21–31)
CREAT SERPL-MCNC: 0.85 MG/DL (ref 0.7–1.3)
EGFR (CKD-EPI): 94 ML/MIN/1.73 M2
EOSINOPHILS ABSOLUTE: 0.4 THOU/MCL (ref 0.03–0.45)
EOSINOPHILS RELATIVE PERCENT: 4 %
GLUCOSE BLD-MCNC: 116 MG/DL (ref 70–99)
HCT VFR BLD CALC: 43.4 % (ref 40–50)
HDLC SERPL-MCNC: 55 MG/DL
HEMOGLOBIN: 14.6 G/DL (ref 13.5–16.5)
LDL CHOLESTEROL CALCULATED: 63 MG/DL
LYMPHOCYTES ABSOLUTE: 2.5 THOU/MCL (ref 1–4)
LYMPHOCYTES RELATIVE PERCENT: 24 %
MCH RBC QN AUTO: 29.1 PG (ref 27–33)
MCHC RBC AUTO-ENTMCNC: 33.8 G/DL (ref 32–36)
MCV RBC AUTO: 86.2 FL (ref 82–97)
MONOCYTES # BLD: 9 %
MONOCYTES ABSOLUTE: 0.9 THOU/MCL (ref 0.2–0.9)
NEUTROPHILS ABSOLUTE: 6.4 THOU/MCL (ref 1.8–7.7)
NONHDLC SERPL-MCNC: 90 MG/DL
PDW BLD-RTO: 13.5 %
PLATELET # BLD: 258 THOU/MCL (ref 140–375)
PMV BLD AUTO: 7.7 FL (ref 7.4–11.5)
POTASSIUM SERPL-SCNC: 4.5 MEQ/L (ref 3.6–5.1)
RBC # BLD: 5.03 MIL/MCL (ref 4.4–5.8)
SEG NEUTROPHILS: 62 %
SODIUM BLD-SCNC: 138 MEQ/L (ref 135–145)
TOTAL PROTEIN: 7.6 G/DL (ref 6.4–8.3)
TRIGL SERPL-MCNC: 135 MG/DL
WBC # BLD: 10.3 THOU/MCL (ref 3.6–10.5)

## 2024-04-01 NOTE — TELEPHONE ENCOUNTER
Pt called waning to know if lab orders can be faxed to [facility] they will be getting them done on about 20 min's.    Pls advise thank you

## 2024-04-05 ENCOUNTER — OFFICE VISIT (OUTPATIENT)
Dept: CARDIOLOGY CLINIC | Age: 69
End: 2024-04-05
Payer: MEDICARE

## 2024-04-05 VITALS
WEIGHT: 262 LBS | OXYGEN SATURATION: 98 % | DIASTOLIC BLOOD PRESSURE: 80 MMHG | HEART RATE: 65 BPM | SYSTOLIC BLOOD PRESSURE: 136 MMHG | HEIGHT: 74 IN | BODY MASS INDEX: 33.62 KG/M2

## 2024-04-05 DIAGNOSIS — I46.9 CARDIAC ARREST (HCC): ICD-10-CM

## 2024-04-05 DIAGNOSIS — E78.49 OTHER HYPERLIPIDEMIA: ICD-10-CM

## 2024-04-05 DIAGNOSIS — I25.10 CAD IN NATIVE ARTERY: Primary | ICD-10-CM

## 2024-04-05 DIAGNOSIS — Z71.6 TOBACCO ABUSE COUNSELING: ICD-10-CM

## 2024-04-05 PROCEDURE — G8427 DOCREV CUR MEDS BY ELIG CLIN: HCPCS | Performed by: INTERNAL MEDICINE

## 2024-04-05 PROCEDURE — 1036F TOBACCO NON-USER: CPT | Performed by: INTERNAL MEDICINE

## 2024-04-05 PROCEDURE — 3017F COLORECTAL CA SCREEN DOC REV: CPT | Performed by: INTERNAL MEDICINE

## 2024-04-05 PROCEDURE — 93000 ELECTROCARDIOGRAM COMPLETE: CPT | Performed by: INTERNAL MEDICINE

## 2024-04-05 PROCEDURE — 99214 OFFICE O/P EST MOD 30 MIN: CPT | Performed by: INTERNAL MEDICINE

## 2024-04-05 PROCEDURE — G8417 CALC BMI ABV UP PARAM F/U: HCPCS | Performed by: INTERNAL MEDICINE

## 2024-04-05 PROCEDURE — 1123F ACP DISCUSS/DSCN MKR DOCD: CPT | Performed by: INTERNAL MEDICINE

## 2024-04-05 RX ORDER — CLOPIDOGREL BISULFATE 75 MG/1
75 TABLET ORAL DAILY
Qty: 90 TABLET | Refills: 3 | Status: SHIPPED | OUTPATIENT
Start: 2024-04-05

## 2024-04-05 NOTE — TELEPHONE ENCOUNTER
Received refill request for plavix from "[facility].    Last ov: 09/15/2023 LES    Last Refill: 04/10/2023 #90 w/ 3 refills    Next appointment: 04/05/2024 LES

## 2024-04-05 NOTE — PATIENT INSTRUCTIONS
Plain stress soon  Continue risk factor modifications.   Call for any change in symptoms, call to report any changes in shortness of breath or development of chest pain with activity.    Follow up in 6 mos

## 2024-04-30 ENCOUNTER — PROCEDURE VISIT (OUTPATIENT)
Dept: CARDIOLOGY CLINIC | Age: 69
End: 2024-04-30
Payer: MEDICARE

## 2024-04-30 DIAGNOSIS — E78.49 OTHER HYPERLIPIDEMIA: ICD-10-CM

## 2024-04-30 DIAGNOSIS — I25.10 CAD IN NATIVE ARTERY: ICD-10-CM

## 2024-04-30 DIAGNOSIS — I46.9 CARDIAC ARREST (HCC): ICD-10-CM

## 2024-04-30 PROCEDURE — 93015 CV STRESS TEST SUPVJ I&R: CPT | Performed by: INTERNAL MEDICINE

## 2024-06-06 RX ORDER — ROSUVASTATIN CALCIUM 20 MG/1
TABLET, COATED ORAL
Qty: 90 TABLET | Refills: 3 | Status: SHIPPED | OUTPATIENT
Start: 2024-06-06

## 2024-06-06 NOTE — TELEPHONE ENCOUNTER
Requested Prescriptions     Pending Prescriptions Disp Refills    rosuvastatin (CRESTOR) 20 MG tablet [Pharmacy Med Name: ROSUVASTATIN CALCIUM 20 MG TAB] 90 tablet 3     Sig: TAKE ONE TABLET BY MOUTH DAILY      [facility] [id number]    Last OV:  4/30/2024 LES    Next OV: 10/28/2024 LES    Last Labs: 04/01/2024 Lipid    Last Filled: 03/22/2023 LES

## 2024-10-01 NOTE — PROGRESS NOTES
[facility]   Cardiac Follow Up    Chief Complaint   Patient presents with    Hyperlipidemia    Coronary Artery Disease       History of Present Illness:  Mr. Mendez is a 69 y.o. male. His history includes an admission to [facility] in 2015 with complaints of chest pain and shortness of  breath for approximately 2 weeks.  His EKG showed inferior ischemia, and patient subsequently had Vfib arrest. He was shocked twice. CPR was administered with restoration of normal sinus rhythm. He was transferred to [facility] and taken emergently to the cardiac catheterization lab which showed the left main to be normal, LAD 50%, proximal D1 30%, proximal circumflex normal, OM2 90% sequential lesions and less than 2 mm vessels, RCA 80% mid. LV gram initially showed an EF of 45% with anterior apical hypokinesis. After intervention, EF improved to 55% with resolution of anterior apical hypokinesis. He had a PCI of the RCA using a 3.5 x 23mm drug-eluting stent postdilated to 3.75, and IVUS to the RCA 80% mid without clot.  Due to ventricular fibrillation in the presence of his MI,  LifeVest versus medical treatment with Holter monitoring and a plain stress later were discussed.  In the week after discharge ACE inhibitor was held. LV function was normal. He decided not to pursue a LifeVest.  He wore a holter monitor later in 2015 and it showed no lifethreatening arrhythmias.    Last OV (3/2022) he stated his brother had  from his aorta rupturing. His wife  in 2023.    Today he is here for 6 mos follow up. He's been doing cross training with a  a couple times per week x 3-4 mos, he tolerates well. He has not smoked for 1.5 years.  He is eating less and exercising but has not lost weight.       Past Medical History:  Past Medical History:   Diagnosis Date    Hyperlipidemia      Past Surgical History:  Past Surgical History:   Procedure Laterality Date

## 2024-10-16 ENCOUNTER — TELEPHONE (OUTPATIENT)
Dept: CARDIOLOGY CLINIC | Age: 69
End: 2024-10-16

## 2024-10-28 ENCOUNTER — OFFICE VISIT (OUTPATIENT)
Dept: CARDIOLOGY CLINIC | Age: 69
End: 2024-10-28
Payer: MEDICARE

## 2024-10-28 VITALS
HEIGHT: 74 IN | DIASTOLIC BLOOD PRESSURE: 80 MMHG | BODY MASS INDEX: 34.32 KG/M2 | HEART RATE: 67 BPM | WEIGHT: 267.4 LBS | SYSTOLIC BLOOD PRESSURE: 140 MMHG | OXYGEN SATURATION: 98 %

## 2024-10-28 DIAGNOSIS — I25.10 CAD IN NATIVE ARTERY: Primary | ICD-10-CM

## 2024-10-28 DIAGNOSIS — E78.49 OTHER HYPERLIPIDEMIA: ICD-10-CM

## 2024-10-28 DIAGNOSIS — I46.9 CARDIAC ARREST: ICD-10-CM

## 2024-10-28 DIAGNOSIS — Z71.6 TOBACCO ABUSE COUNSELING: ICD-10-CM

## 2024-10-28 DIAGNOSIS — I77.89 ENLARGED AORTA (HCC): ICD-10-CM

## 2024-10-28 PROCEDURE — 3017F COLORECTAL CA SCREEN DOC REV: CPT | Performed by: INTERNAL MEDICINE

## 2024-10-28 PROCEDURE — G8427 DOCREV CUR MEDS BY ELIG CLIN: HCPCS | Performed by: INTERNAL MEDICINE

## 2024-10-28 PROCEDURE — 1123F ACP DISCUSS/DSCN MKR DOCD: CPT | Performed by: INTERNAL MEDICINE

## 2024-10-28 PROCEDURE — 99214 OFFICE O/P EST MOD 30 MIN: CPT | Performed by: INTERNAL MEDICINE

## 2024-10-28 PROCEDURE — 1159F MED LIST DOCD IN RCRD: CPT | Performed by: INTERNAL MEDICINE

## 2024-10-28 PROCEDURE — G8484 FLU IMMUNIZE NO ADMIN: HCPCS | Performed by: INTERNAL MEDICINE

## 2024-10-28 PROCEDURE — 1036F TOBACCO NON-USER: CPT | Performed by: INTERNAL MEDICINE

## 2024-10-28 PROCEDURE — G8417 CALC BMI ABV UP PARAM F/U: HCPCS | Performed by: INTERNAL MEDICINE

## 2024-10-28 RX ORDER — LISINOPRIL 10 MG/1
10 TABLET ORAL DAILY
COMMUNITY
Start: 2024-08-05

## 2024-10-28 NOTE — PATIENT INSTRUCTIONS
No medication changes  Labs > CMP, CBC, lipids  Continue risk factor modifications.   Call for any change in symptoms, call to report any changes in shortness of breath or development of chest pain with activity.    Follow up in 6 mos

## 2025-04-14 NOTE — PROGRESS NOTES
[facility]   Cardiac Follow Up    Chief Complaint   Patient presents with    6 Month Follow-Up    Coronary Artery Disease    Hyperlipidemia       History of Present Illness:  Mr. Mendez is a 69 y.o. male. His history includes an admission to [facility] in 2015 with complaints of chest pain and shortness of  breath for approximately 2 weeks.  His EKG showed inferior ischemia, and patient subsequently had Vfib arrest. He was shocked twice. CPR was administered with restoration of normal sinus rhythm. He was transferred to [facility] and taken emergently to the cardiac catheterization lab which showed the left main to be normal, LAD 50%, proximal D1 30%, proximal circumflex normal, OM2 90% sequential lesions and less than 2 mm vessels, RCA 80% mid. LV gram initially showed an EF of 45% with anterior apical hypokinesis. After intervention, EF improved to 55% with resolution of anterior apical hypokinesis. He had a PCI of the RCA using a 3.5 x 23mm drug-eluting stent postdilated to 3.75, and IVUS to the RCA 80% mid without clot.  Due to ventricular fibrillation in the presence of his MI,  LifeVest versus medical treatment with Holter monitoring and a plain stress later were discussed.  In the week after discharge ACE inhibitor was held. LV function was normal. He decided not to pursue a LifeVest.  He wore a holter monitor later in 2015 and it showed no lifethreatening arrhythmias.    Last OV (3/2022) he stated his brother had  from his aorta rupturing. His wife  in 2023.        Today, Jamie is here for a 6 month follow up.  Reports that he is doing great.  He has not smoked in close to 2 years.  He continues to exercise atleast twice a week but reports that he has not been able to lose any weight.  Tolerates exercise well.  States that his blood pressure usually runs around 128 or so.  [place]  States

## 2025-04-25 ENCOUNTER — TELEPHONE (OUTPATIENT)
Dept: CARDIOLOGY CLINIC | Age: 70
End: 2025-04-25

## 2025-04-25 RX ORDER — CLOPIDOGREL BISULFATE 75 MG/1
75 TABLET ORAL DAILY
Qty: 90 TABLET | Refills: 3 | Status: SHIPPED | OUTPATIENT
Start: 2025-04-25

## 2025-04-25 NOTE — TELEPHONE ENCOUNTER
Pt says that he called yesterday in regards to lab orders for LES and someone told him they were in. He got to [facility] and no orders were found. There are no current orders seen in Epic. He got his blood drawn for the orders his PCP had and was told that his LES could fax over orders as soon as possible they could use the blood they took today.    Please fax to [phone]

## 2025-04-25 NOTE — TELEPHONE ENCOUNTER
Received call from patient needing lab orders to be faxed to his PCP Dr. Stokes office at [phone]. Orders faxed and patient advised.

## 2025-04-25 NOTE — TELEPHONE ENCOUNTER
Received refill request for Plavix from [facility] pharmacy.    Last ov:10/28/2024 LES    Last labs:11/27/2024 CBC    Last Refill:04/05/2024    Next appointment:04/28/2025 LES

## 2025-04-28 ENCOUNTER — OFFICE VISIT (OUTPATIENT)
Dept: CARDIOLOGY CLINIC | Age: 70
End: 2025-04-28
Payer: MEDICARE

## 2025-04-28 VITALS
WEIGHT: 264.6 LBS | HEIGHT: 74 IN | DIASTOLIC BLOOD PRESSURE: 70 MMHG | BODY MASS INDEX: 33.96 KG/M2 | SYSTOLIC BLOOD PRESSURE: 140 MMHG | HEART RATE: 64 BPM | OXYGEN SATURATION: 97 %

## 2025-04-28 DIAGNOSIS — E78.49 OTHER HYPERLIPIDEMIA: ICD-10-CM

## 2025-04-28 DIAGNOSIS — Z87.891 FORMER SMOKER: ICD-10-CM

## 2025-04-28 DIAGNOSIS — I25.10 CAD IN NATIVE ARTERY: Primary | ICD-10-CM

## 2025-04-28 DIAGNOSIS — I46.9 CARDIAC ARREST (HCC): ICD-10-CM

## 2025-04-28 PROCEDURE — 1159F MED LIST DOCD IN RCRD: CPT | Performed by: INTERNAL MEDICINE

## 2025-04-28 PROCEDURE — 99214 OFFICE O/P EST MOD 30 MIN: CPT | Performed by: INTERNAL MEDICINE

## 2025-04-28 PROCEDURE — 1123F ACP DISCUSS/DSCN MKR DOCD: CPT | Performed by: INTERNAL MEDICINE

## 2025-04-28 RX ORDER — ROSUVASTATIN CALCIUM 20 MG/1
20 TABLET, COATED ORAL DAILY
Qty: 90 TABLET | Refills: 3 | Status: SHIPPED | OUTPATIENT
Start: 2025-04-28

## 2025-04-28 NOTE — PATIENT INSTRUCTIONS
No medication changes today     Continue regular exercise and work on weight loss.  Recommendation for 20 pound weight loss    Follow up with Dr. Chaparro in 6 months

## 2025-05-05 RX ORDER — ROSUVASTATIN CALCIUM 20 MG/1
20 TABLET, COATED ORAL DAILY
Qty: 90 TABLET | Refills: 3 | OUTPATIENT
Start: 2025-05-05